# Patient Record
Sex: MALE | Race: BLACK OR AFRICAN AMERICAN | NOT HISPANIC OR LATINO | Employment: FULL TIME | URBAN - METROPOLITAN AREA
[De-identification: names, ages, dates, MRNs, and addresses within clinical notes are randomized per-mention and may not be internally consistent; named-entity substitution may affect disease eponyms.]

---

## 2019-05-26 ENCOUNTER — HOSPITAL ENCOUNTER (EMERGENCY)
Facility: CLINIC | Age: 29
Discharge: HOME | End: 2019-05-27
Attending: EMERGENCY MEDICINE | Admitting: EMERGENCY MEDICINE

## 2019-05-26 DIAGNOSIS — R10.9 BILATERAL FLANK PAIN: ICD-10-CM

## 2019-05-26 DIAGNOSIS — M54.6 ACUTE BILATERAL THORACIC BACK PAIN: ICD-10-CM

## 2019-05-26 LAB
ALBUMIN SERPL-MCNC: 4 G/DL (ref 3.4–5)
ALBUMIN UR-MCNC: NEGATIVE MG/DL
ALP SERPL-CCNC: 91 U/L (ref 40–150)
ALT SERPL W P-5'-P-CCNC: 64 U/L (ref 0–70)
ANION GAP SERPL CALCULATED.3IONS-SCNC: 7 MMOL/L (ref 3–14)
APPEARANCE UR: CLEAR
AST SERPL W P-5'-P-CCNC: 19 U/L (ref 0–45)
BACTERIA #/AREA URNS HPF: ABNORMAL /HPF
BASOPHILS # BLD AUTO: 0 10E9/L (ref 0–0.2)
BASOPHILS NFR BLD AUTO: 0.6 %
BILIRUB SERPL-MCNC: 0.3 MG/DL (ref 0.2–1.3)
BILIRUB UR QL STRIP: NEGATIVE
BUN SERPL-MCNC: 14 MG/DL (ref 7–30)
CALCIUM SERPL-MCNC: 9.2 MG/DL (ref 8.5–10.1)
CHLORIDE SERPL-SCNC: 106 MMOL/L (ref 94–109)
CO2 SERPL-SCNC: 28 MMOL/L (ref 20–32)
COLOR UR AUTO: ABNORMAL
CREAT SERPL-MCNC: 0.92 MG/DL (ref 0.66–1.25)
DIFFERENTIAL METHOD BLD: NORMAL
EOSINOPHIL # BLD AUTO: 0.3 10E9/L (ref 0–0.7)
EOSINOPHIL NFR BLD AUTO: 4.2 %
ERYTHROCYTE [DISTWIDTH] IN BLOOD BY AUTOMATED COUNT: 13 % (ref 10–15)
GFR SERPL CREATININE-BSD FRML MDRD: >90 ML/MIN/{1.73_M2}
GLUCOSE SERPL-MCNC: 128 MG/DL (ref 70–99)
GLUCOSE UR STRIP-MCNC: NEGATIVE MG/DL
HCT VFR BLD AUTO: 42.5 % (ref 40–53)
HGB BLD-MCNC: 14.9 G/DL (ref 13.3–17.7)
HGB UR QL STRIP: ABNORMAL
IMM GRANULOCYTES # BLD: 0 10E9/L (ref 0–0.4)
IMM GRANULOCYTES NFR BLD: 0.3 %
KETONES UR STRIP-MCNC: NEGATIVE MG/DL
LEUKOCYTE ESTERASE UR QL STRIP: ABNORMAL
LIPASE SERPL-CCNC: 104 U/L (ref 73–393)
LYMPHOCYTES # BLD AUTO: 1.8 10E9/L (ref 0.8–5.3)
LYMPHOCYTES NFR BLD AUTO: 27.3 %
MCH RBC QN AUTO: 28.9 PG (ref 26.5–33)
MCHC RBC AUTO-ENTMCNC: 35.1 G/DL (ref 31.5–36.5)
MCV RBC AUTO: 82 FL (ref 78–100)
MONOCYTES # BLD AUTO: 0.5 10E9/L (ref 0–1.3)
MONOCYTES NFR BLD AUTO: 6.9 %
NEUTROPHILS # BLD AUTO: 4 10E9/L (ref 1.6–8.3)
NEUTROPHILS NFR BLD AUTO: 60.7 %
NITRATE UR QL: NEGATIVE
NRBC # BLD AUTO: 0 10*3/UL
NRBC BLD AUTO-RTO: 0 /100
PH UR STRIP: 7.5 PH (ref 5–7)
PLATELET # BLD AUTO: 296 10E9/L (ref 150–450)
POTASSIUM SERPL-SCNC: 3.1 MMOL/L (ref 3.4–5.3)
PROT SERPL-MCNC: 8 G/DL (ref 6.8–8.8)
RBC # BLD AUTO: 5.16 10E12/L (ref 4.4–5.9)
RBC #/AREA URNS AUTO: 6 /HPF (ref 0–2)
SODIUM SERPL-SCNC: 141 MMOL/L (ref 133–144)
SOURCE: ABNORMAL
SP GR UR STRIP: 1.01 (ref 1–1.03)
UROBILINOGEN UR STRIP-MCNC: NORMAL MG/DL (ref 0–2)
WBC # BLD AUTO: 6.6 10E9/L (ref 4–11)
WBC #/AREA URNS AUTO: <1 /HPF (ref 0–5)

## 2019-05-26 PROCEDURE — 80053 COMPREHEN METABOLIC PANEL: CPT | Performed by: EMERGENCY MEDICINE

## 2019-05-26 PROCEDURE — 96375 TX/PRO/DX INJ NEW DRUG ADDON: CPT

## 2019-05-26 PROCEDURE — 25000128 H RX IP 250 OP 636: Performed by: EMERGENCY MEDICINE

## 2019-05-26 PROCEDURE — 96374 THER/PROPH/DIAG INJ IV PUSH: CPT

## 2019-05-26 PROCEDURE — 85025 COMPLETE CBC W/AUTO DIFF WBC: CPT | Performed by: EMERGENCY MEDICINE

## 2019-05-26 PROCEDURE — 99284 EMERGENCY DEPT VISIT MOD MDM: CPT | Mod: 25

## 2019-05-26 PROCEDURE — 81001 URINALYSIS AUTO W/SCOPE: CPT | Performed by: EMERGENCY MEDICINE

## 2019-05-26 PROCEDURE — 83690 ASSAY OF LIPASE: CPT | Performed by: EMERGENCY MEDICINE

## 2019-05-26 PROCEDURE — 25000132 ZZH RX MED GY IP 250 OP 250 PS 637: Performed by: EMERGENCY MEDICINE

## 2019-05-26 RX ORDER — CYCLOBENZAPRINE HCL 10 MG
10 TABLET ORAL ONCE
Status: COMPLETED | OUTPATIENT
Start: 2019-05-26 | End: 2019-05-26

## 2019-05-26 RX ORDER — POTASSIUM CHLORIDE 1500 MG/1
40 TABLET, EXTENDED RELEASE ORAL ONCE
Status: COMPLETED | OUTPATIENT
Start: 2019-05-26 | End: 2019-05-26

## 2019-05-26 RX ORDER — KETOROLAC TROMETHAMINE 15 MG/ML
10 INJECTION, SOLUTION INTRAMUSCULAR; INTRAVENOUS ONCE
Status: COMPLETED | OUTPATIENT
Start: 2019-05-26 | End: 2019-05-26

## 2019-05-26 RX ORDER — ONDANSETRON 2 MG/ML
4 INJECTION INTRAMUSCULAR; INTRAVENOUS EVERY 30 MIN PRN
Status: DISCONTINUED | OUTPATIENT
Start: 2019-05-26 | End: 2019-05-27 | Stop reason: HOSPADM

## 2019-05-26 RX ADMIN — POTASSIUM CHLORIDE 40 MEQ: 1500 TABLET, EXTENDED RELEASE ORAL at 23:46

## 2019-05-26 RX ADMIN — CYCLOBENZAPRINE HYDROCHLORIDE 10 MG: 10 TABLET, FILM COATED ORAL at 23:05

## 2019-05-26 RX ADMIN — ONDANSETRON 4 MG: 2 INJECTION INTRAMUSCULAR; INTRAVENOUS at 23:05

## 2019-05-26 RX ADMIN — KETOROLAC TROMETHAMINE 10 MG: 15 INJECTION, SOLUTION INTRAMUSCULAR; INTRAVENOUS at 23:05

## 2019-05-26 SDOH — HEALTH STABILITY: MENTAL HEALTH: HOW OFTEN DO YOU HAVE A DRINK CONTAINING ALCOHOL?: NEVER

## 2019-05-26 ASSESSMENT — ENCOUNTER SYMPTOMS
SHORTNESS OF BREATH: 0
SLEEP DISTURBANCE: 1
VOMITING: 0
BACK PAIN: 1
FEVER: 0
NAUSEA: 1
CHILLS: 1
HEMATURIA: 0
CONSTIPATION: 1
FREQUENCY: 1
DYSURIA: 0

## 2019-05-26 ASSESSMENT — MIFFLIN-ST. JEOR: SCORE: 1737.36

## 2019-05-26 NOTE — ED AVS SNAPSHOT
Emergency Department  [address] [place] [address]  Phone:  [phone]  Fax:  [phone]                                    Cornelia Sands   MRN: [id number]    Department:   Emergency Department   Date of Visit:  5/26/2019           After Visit Summary Signature Page    I have received my discharge instructions, and my questions have been answered. I have discussed any challenges I see with this plan with the nurse or doctor.    ..........................................................................................................................................  Patient/Patient Representative Signature      ..........................................................................................................................................  Patient Representative Print Name and Relationship to Patient    ..................................................               ................................................  Date                                   Time    ..........................................................................................................................................  Reviewed by Signature/Title    ...................................................              ..............................................  Date                                               Time          22EPIC Rev 08/18

## 2019-05-27 ENCOUNTER — NURSE TRIAGE (OUTPATIENT)
Dept: NURSING | Facility: CLINIC | Age: 29
End: 2019-05-27

## 2019-05-27 VITALS
WEIGHT: 170 LBS | TEMPERATURE: 98.2 F | RESPIRATION RATE: 16 BRPM | HEART RATE: 86 BPM | BODY MASS INDEX: 24.34 KG/M2 | DIASTOLIC BLOOD PRESSURE: 74 MMHG | HEIGHT: 70 IN | OXYGEN SATURATION: 100 % | SYSTOLIC BLOOD PRESSURE: 129 MMHG

## 2019-05-27 RX ORDER — CYCLOBENZAPRINE HCL 10 MG
10 TABLET ORAL 3 TIMES DAILY PRN
Qty: 15 TABLET | Refills: 0 | Status: SHIPPED | OUTPATIENT
Start: 2019-05-27 | End: 2021-04-12

## 2019-05-27 RX ORDER — IBUPROFEN 600 MG/1
600 TABLET, FILM COATED ORAL EVERY 6 HOURS PRN
Qty: 30 TABLET | Refills: 0 | Status: SHIPPED | OUTPATIENT
Start: 2019-05-27 | End: 2019-10-09

## 2019-05-27 NOTE — ED PROVIDER NOTES
History     Chief Complaint:  Back Pain     HPI   Cornelia Sands is a 29 year old male who presents to the emergency department today for evaluation of low back pain. The patient reports a deep, pressure like pain in his lower back and posterior thighs bilaterally that began approximately two days ago and has worsened since onset, now accompanied by chills, nausea with eating, decreased stool output, and increased urinary frequency. He explains that his pain is exacerbated at night when laying down and is now inhibiting his ability to sleep, prompting presentation. Here the patient ranks his current pain at an 8/10. He reports mild improvement with ibuprofen initially, though he states he has not taken this in the last 24 hours. The patient denies any fever, emesis, hematuria, dysuria, shortness of breath, or recent trauma or injury that may have resulted in his pain. Of note, the patient reports that he was placed on medication for an issue with his left kidney in 2008, though he has not had any issue since.     Allergies:  No Known Drug Allergies    Medications:    Medications reviewed. No current medications.     Past Medical History:    Issue with left kidney    Past Surgical History:    Surgical history reviewed. No pertinent surgical history.    Family History:    Mother: lung cancer  Father: abdominal tumor    Social History:  Patient used to reside in [place] and receive  Smoking Status: Never Smoker  Smokeless Tobacco: Current user  Alcohol Use: Negative  Drug Use: Negative     Review of Systems   Constitutional: Positive for chills. Negative for fever.   Respiratory: Negative for shortness of breath.    Gastrointestinal: Positive for constipation and nausea. Negative for vomiting.   Genitourinary: Positive for frequency. Negative for dysuria and hematuria.   Musculoskeletal: Positive for back pain.        Thigh pain   Psychiatric/Behavioral: Positive for sleep disturbance.   All other systems reviewed  "and are negative.      Physical Exam     Patient Vitals for the past 24 hrs:   BP Temp Temp src Pulse Resp SpO2 Height Weight   05/27/19 0020 129/74 -- -- 86 16 100 % -- --   05/26/19 2202 136/81 98.2  F (36.8  C) Oral 103 18 100 % 1.778 m (5' 10\") 77.1 kg (170 lb)     Physical Exam  Constitutional:  Appears well-developed and well-nourished, though uncomfortable. Cooperative.   HENT:   Head:    Atraumatic.   Mouth/Throat:   Oropharynx is without erythema or exudate and mucous membranes are moist.   Eyes:    Conjunctivae normal and EOM are normal.      Pupils are equal, round, and reactive to light.   Neck:    Normal range of motion. Neck supple.   Cardiovascular:  Normal rate, regular rhythm, normal heart sounds and radial and dorsalis pedis pulses are 2+ and symmetric.    Pulmonary/Chest:  Effort normal and breath sounds normal.   Abdominal:   Soft. Bowel sounds are normal.      No splenomegaly or hepatomegaly. No tenderness. No rebound.      No CVA tenderness.  Musculoskeletal:  Normal range of motion. No edema and no tenderness. Gait normal.  Bilateral lower extremity strength is normal.    Neurological:  Alert. Normal strength. No cranial nerve deficit. Gait normal.  Normal sensation to light touch in the lower extremities.  Skin:    Skin is warm and dry.   Psychiatric:   Normal mood and affect.     Emergency Department Course     Laboratory:  Laboratory findings were communicated with the patient who voiced understanding of the findings.    UA with Microscopic: Blood trace (A), pH 7.5 (H), Leukocyte Esterase small (A), RBC 6 (H), Bacteria few (A), o/w WNL   CBC: WBC 6.6, HGB 14.9,   CMP: Potassium 3.1 (L), Glucose 128 (H) o/w WNL (Creatinine 0.92)  Lipase: 104    Interventions:  2305 Zofran 4 mg IV  2305 Toradol 10 mg IV  2305 Flexeril 10 mg Oral  2346 Potassium Chloride 40 mEq Oral     Emergency Department Course:    2205 Nursing notes and vitals reviewed.    2209 I performed an exam of the patient as " documented above.     2302 IV was inserted and blood was drawn for laboratory testing, results above.    2344 The patient provided a urine sample here in the emergency department. This was sent for laboratory testing, findings above.    0010 Patient rechecked and updated.     0022 I personally reviewed the laboratory results with the patient and answered all related questions prior to discharge.    Impression & Plan      Medical Decision Making:  Cornelia Sands is a 29 year old male who presents with bilateral flank and thigh pain.  Associated symptoms include chills, nausea, and urinary frequency.   A broad differential diagnosis was considered including diverticulitis, ureterolithiasis, tumor, colitis, cholecystitis, aneurysm, dissection, hydronephrosis, pneumonia, rib fracture, UTI, pyelonephritis amongst many other etiologies.   The workup in the ED is at this point negative.  No etiology for the patients pain is found at this point and my suspicion of an intraabdominal or intrathoracic catastrophe or other worrisome etiology is very low.  I will not therefore admit for serial exams and further workup.  Patient is hemodynamically stable in ED. I suspect that his pain is most likely musculoskeletal in nature. Plan is home with flank pain recheck by primary care physician or return to ED at that time.  Return for fevers greater than 102, increasing pain, other new symptoms develop.  Flank pain handout given.  Questions were answered.     Diagnosis:    ICD-10-CM    1. Acute bilateral thoracic back pain M54.6    2. Bilateral flank pain R10.9      Disposition:   The patient is discharged to home.    Discharge Medications:  No discharge medications.     Review of your medicines      START taking      Dose / Directions   cyclobenzaprine 10 MG tablet  Commonly known as:  FLEXERIL      Dose:  10 mg  Take 1 tablet (10 mg) by mouth 3 times daily as needed for muscle spasms  Quantity:  15 tablet  Refills:  0     ibuprofen  600 MG tablet  Commonly known as:  ADVIL/MOTRIN      Dose:  600 mg  Take 1 tablet (600 mg) by mouth every 6 hours as needed for moderate pain  Quantity:  30 tablet  Refills:  0           Where to get your medicines      Some of these will need a paper prescription and others can be bought over the counter. Ask your nurse if you have questions.    Bring a paper prescription for each of these medications    cyclobenzaprine 10 MG tablet    ibuprofen 600 MG tablet       Scribe Disclosure:  I, Subha Aldridge, am serving as a scribe at 10:06 PM on 5/26/2019 to document services personally performed by Wilmer Yanes MD based on my observations and the provider's statements to me.     EMERGENCY DEPARTMENT       Wilmer Yanes MD  05/28/19 0612

## 2019-05-27 NOTE — TELEPHONE ENCOUNTER
"Caller states she took a flexeril tablet 4 hours after taken Toradol and potassium while in the ED. Caller wants to know if she will be ok, or if she took too much. Triager advised caller that medication was taken 4 hours apart and that should be ok as long as she is not having any adverse reactions. Caller advised to home care. Caller verbalized and understands directives.    Reason for Disposition    Caller has medication question only, adult not sick, and triager answers question    Additional Information    Negative: Drug overdose and nurse unable to answer question    Negative: Caller requesting information not related to medicine    Negative: Caller requesting a prescription for Strep throat and has a positive culture result    Negative: Rash while taking a medication or within 3 days of stopping it    Negative: Immunization reaction suspected    Negative: [1] Asthma and [2] having symptoms of asthma (cough, wheezing, etc)    Negative: MORE THAN A DOUBLE DOSE of a prescription or over-the-counter (OTC) drug    Negative: [1] DOUBLE DOSE (an extra dose or lesser amount) of over-the-counter (OTC) drug AND [2] any symptoms (e.g., dizziness, nausea, pain, sleepiness)    Negative: [1] DOUBLE DOSE (an extra dose or lesser amount) of prescription drug AND [2] any symptoms (e.g., dizziness, nausea, pain, sleepiness)    Negative: Took another person's prescription drug    Negative: [1] DOUBLE DOSE (an extra dose or lesser amount) of prescription drug AND [2] NO symptoms (Exception: a double dose of antibiotics)    Negative: Diabetes drug error or overdose (e.g., insulin or extra dose)    Negative: [1] Request for URGENT new prescription or refill of \"essential\" medication (i.e., likelihood of harm to patient if not taken) AND [2] triager unable to fill per unit policy    Negative: [1] Prescription not at pharmacy AND [2] was prescribed today by PCP    Negative: Pharmacy calling with prescription questions and triager " unable to answer question    Negative: Caller has URGENT medication question about med that PCP prescribed and triager unable to answer question    Negative: Caller has NON-URGENT medication question about med that PCP prescribed and triager unable to answer question    Negative: Caller requesting a NON-URGENT new prescription or refill and triager unable to refill per unit policy    Negative: [1] DOUBLE DOSE (an extra dose or lesser amount) of over-the-counter (OTC) drug AND [2] NO symptoms    Negative: [1] DOUBLE DOSE (an extra dose or lesser amount) of antibiotic drug AND [2] NO symptoms    Negative: Caller has medication question about med not prescribed by PCP and triager unable to answer question (e.g., compatibility with other med, storage)    Protocols used: MEDICATION QUESTION CALL-A-

## 2019-10-08 ENCOUNTER — HOSPITAL ENCOUNTER (EMERGENCY)
Facility: CLINIC | Age: 29
Discharge: HOME | End: 2019-10-09
Attending: EMERGENCY MEDICINE | Admitting: EMERGENCY MEDICINE

## 2019-10-08 DIAGNOSIS — M54.50 ACUTE BILATERAL LOW BACK PAIN WITHOUT SCIATICA: ICD-10-CM

## 2019-10-08 PROCEDURE — 96372 THER/PROPH/DIAG INJ SC/IM: CPT

## 2019-10-08 PROCEDURE — 25000132 ZZH RX MED GY IP 250 OP 250 PS 637: Performed by: EMERGENCY MEDICINE

## 2019-10-08 PROCEDURE — 99284 EMERGENCY DEPT VISIT MOD MDM: CPT | Mod: 25

## 2019-10-08 PROCEDURE — 25000128 H RX IP 250 OP 636: Performed by: EMERGENCY MEDICINE

## 2019-10-08 RX ORDER — PREDNISONE 20 MG/1
TABLET ORAL
Qty: 10 TABLET | Refills: 0 | Status: SHIPPED | OUTPATIENT
Start: 2019-10-08 | End: 2021-04-12

## 2019-10-08 RX ORDER — KETOROLAC TROMETHAMINE 15 MG/ML
15 INJECTION, SOLUTION INTRAMUSCULAR; INTRAVENOUS ONCE
Status: COMPLETED | OUTPATIENT
Start: 2019-10-08 | End: 2019-10-08

## 2019-10-08 RX ORDER — OXYCODONE AND ACETAMINOPHEN 5; 325 MG/1; MG/1
1 TABLET ORAL ONCE
Status: COMPLETED | OUTPATIENT
Start: 2019-10-08 | End: 2019-10-08

## 2019-10-08 RX ADMIN — OXYCODONE HYDROCHLORIDE AND ACETAMINOPHEN 1 TABLET: 5; 325 TABLET ORAL at 23:44

## 2019-10-08 RX ADMIN — KETOROLAC TROMETHAMINE 15 MG: 15 INJECTION, SOLUTION INTRAMUSCULAR; INTRAVENOUS at 23:46

## 2019-10-08 ASSESSMENT — ENCOUNTER SYMPTOMS
MYALGIAS: 1
FEVER: 0
BACK PAIN: 1
DYSURIA: 0
HEMATURIA: 0

## 2019-10-08 ASSESSMENT — MIFFLIN-ST. JEOR: SCORE: 1762.32

## 2019-10-08 NOTE — ED AVS SNAPSHOT
Emergency Department  [address] [place] [address]  Phone:  [phone]  Fax:  [phone]                                    Cornelia Sands   MRN: [id number]    Department:   Emergency Department   Date of Visit:  10/8/2019           After Visit Summary Signature Page    I have received my discharge instructions, and my questions have been answered. I have discussed any challenges I see with this plan with the nurse or doctor.    ..........................................................................................................................................  Patient/Patient Representative Signature      ..........................................................................................................................................  Patient Representative Print Name and Relationship to Patient    ..................................................               ................................................  Date                                   Time    ..........................................................................................................................................  Reviewed by Signature/Title    ...................................................              ..............................................  Date                                               Time          22EPIC Rev 08/18

## 2019-10-09 VITALS
HEART RATE: 75 BPM | RESPIRATION RATE: 16 BRPM | DIASTOLIC BLOOD PRESSURE: 60 MMHG | OXYGEN SATURATION: 100 % | TEMPERATURE: 97 F | SYSTOLIC BLOOD PRESSURE: 110 MMHG

## 2019-10-09 VITALS
HEIGHT: 71 IN | HEART RATE: 61 BPM | RESPIRATION RATE: 16 BRPM | SYSTOLIC BLOOD PRESSURE: 116 MMHG | TEMPERATURE: 98.1 F | DIASTOLIC BLOOD PRESSURE: 76 MMHG | OXYGEN SATURATION: 99 % | WEIGHT: 172 LBS | BODY MASS INDEX: 24.08 KG/M2

## 2019-10-09 DIAGNOSIS — M54.50 ACUTE BILATERAL LOW BACK PAIN WITHOUT SCIATICA: ICD-10-CM

## 2019-10-09 LAB
ALBUMIN UR-MCNC: NEGATIVE MG/DL
APPEARANCE UR: CLEAR
BILIRUB UR QL STRIP: NEGATIVE
COLOR UR AUTO: ABNORMAL
GLUCOSE UR STRIP-MCNC: NEGATIVE MG/DL
HGB UR QL STRIP: ABNORMAL
INTERPRETATION ECG - MUSE: NORMAL
KETONES UR STRIP-MCNC: NEGATIVE MG/DL
LEUKOCYTE ESTERASE UR QL STRIP: NEGATIVE
MUCOUS THREADS #/AREA URNS LPF: PRESENT /LPF
NITRATE UR QL: NEGATIVE
PH UR STRIP: 5.5 PH (ref 5–7)
RBC #/AREA URNS AUTO: 4 /HPF (ref 0–2)
SOURCE: ABNORMAL
SP GR UR STRIP: 1.03 (ref 1–1.03)
UROBILINOGEN UR STRIP-MCNC: NORMAL MG/DL (ref 0–2)
WBC #/AREA URNS AUTO: 1 /HPF (ref 0–5)

## 2019-10-09 PROCEDURE — 96372 THER/PROPH/DIAG INJ SC/IM: CPT

## 2019-10-09 PROCEDURE — 93005 ELECTROCARDIOGRAM TRACING: CPT

## 2019-10-09 PROCEDURE — 25000128 H RX IP 250 OP 636: Performed by: EMERGENCY MEDICINE

## 2019-10-09 PROCEDURE — 99284 EMERGENCY DEPT VISIT MOD MDM: CPT

## 2019-10-09 PROCEDURE — 81001 URINALYSIS AUTO W/SCOPE: CPT | Performed by: EMERGENCY MEDICINE

## 2019-10-09 RX ORDER — HALOPERIDOL 5 MG/ML
5 INJECTION INTRAMUSCULAR ONCE
Status: COMPLETED | OUTPATIENT
Start: 2019-10-09 | End: 2019-10-09

## 2019-10-09 RX ORDER — IBUPROFEN 800 MG/1
800 TABLET, FILM COATED ORAL EVERY 8 HOURS PRN
Qty: 30 TABLET | Refills: 0 | Status: SHIPPED | OUTPATIENT
Start: 2019-10-09 | End: 2021-04-12

## 2019-10-09 RX ORDER — DIPHENHYDRAMINE HYDROCHLORIDE 50 MG/ML
25 INJECTION INTRAMUSCULAR; INTRAVENOUS ONCE
Status: COMPLETED | OUTPATIENT
Start: 2019-10-09 | End: 2019-10-09

## 2019-10-09 RX ORDER — HYDROCODONE BITARTRATE AND ACETAMINOPHEN 5; 325 MG/1; MG/1
1 TABLET ORAL EVERY 6 HOURS PRN
Qty: 10 TABLET | Refills: 0 | Status: SHIPPED | OUTPATIENT
Start: 2019-10-09 | End: 2019-10-12

## 2019-10-09 RX ORDER — KETOROLAC TROMETHAMINE 15 MG/ML
15 INJECTION, SOLUTION INTRAMUSCULAR; INTRAVENOUS ONCE
Status: COMPLETED | OUTPATIENT
Start: 2019-10-09 | End: 2019-10-09

## 2019-10-09 RX ADMIN — HALOPERIDOL LACTATE 5 MG: 5 INJECTION, SOLUTION INTRAMUSCULAR at 09:44

## 2019-10-09 RX ADMIN — KETOROLAC TROMETHAMINE 15 MG: 15 INJECTION, SOLUTION INTRAMUSCULAR; INTRAVENOUS at 11:11

## 2019-10-09 RX ADMIN — DIPHENHYDRAMINE HYDROCHLORIDE 25 MG: 50 INJECTION, SOLUTION INTRAMUSCULAR; INTRAVENOUS at 09:44

## 2019-10-09 ASSESSMENT — ENCOUNTER SYMPTOMS
FEVER: 0
NAUSEA: 0
WEAKNESS: 1
BACK PAIN: 1
MYALGIAS: 1
HEADACHES: 1
NUMBNESS: 0

## 2019-10-09 NOTE — DISCHARGE INSTRUCTIONS
Please make an appointment to follow up with [facility] [phone] in 3-5 days even if entirely better.    Discharge Instructions  Back Pain  You were seen today for back pain. Back pain can have many causes, but most will get better without surgery or other specific treatment. Sometimes there is a herniated ( slipped ) disc. We do not usually do MRI scans to look for these right away, since most herniated discs will get better on their own with time.  Today, we did not find any evidence that your back pain was caused by a serious condition. However, sometimes symptoms develop over time and cannot be found during an emergency visit, so it is very important that you follow up with your primary provider.  Generally, every Emergency Department visit should have a follow-up clinic visit with either a primary or a specialty clinic/provider. Please follow-up as instructed by your emergency provider today.    Return to the Emergency Department if:  You develop a fever with your back pain.   You have weakness or change in sensation in one or both legs.  You lose control of your bowels or bladder, or cannot empty your bladder (cannot pee).  Your pain gets much worse.     Follow-up with your provider:  Unless your pain has completely gone away, please make an appointment with your provider within one week. Most of the routine care for back pain is available in a clinic and not the Emergency Department. You may need further management of your back pain, such as more pain medication, imaging such as an X-ray or MRI, or physical therapy.    What can I do to help myself?  Remain Active -- People are often afraid that they will hurt their back further or delay recovery by remaining active, but this is one of the best things you can do for your back. In fact, staying in bed for a long time to rest is not recommended. Studies have shown that people with low back pain recover faster when they remain active. Movement  helps to bring blood flow to the muscles and relieve muscle spasms as well as preventing loss of muscle strength.  Heat -- Using a heating pad can help with low back pain during the first few weeks. Do not sleep with a heating pad, as you can be burned.   Pain medications - You may take a pain medication such as Tylenol  (acetaminophen), Advil , Motrin  (ibuprofen) or Aleve  (naproxen).  If you were given a prescription for medicine here today, be sure to read all of the information (including the package insert) that comes with your prescription.  This will include important information about the medicine, its side effects, and any warnings that you need to know about.  The pharmacist who fills the prescription can provide more information and answer questions you may have about the medicine.  If you have questions or concerns that the pharmacist cannot address, please call or return to the Emergency Department.   Remember that you can always come back to the Emergency Department if you are not able to see your regular provider in the amount of time listed above, if you get any new symptoms, or if there is anything that worries you.

## 2019-10-09 NOTE — ED TRIAGE NOTES
Back pain for last 3-4 days that radiates down legs. Taking ibuprofen and muscle relaxer. also c/o headache. No loss of bowel/bladder.

## 2019-10-09 NOTE — ED PROVIDER NOTES
History     Chief Complaint:  Back pain    HPI   Cornelia Sands is a 29 year old male with a history of back pain who presents to the emergency department today for evaluation of back pain. Patient has had back pain for the last 3-4 days that radiates down his legs. His pain is worse tonight. He denies trauma to the area. His back pain is in his low back. He has been taking ibuprofen and muscle relaxants. Additionally, patient complains of a headache. Denies any episodes of incontinence. He has no history of kidney stones. He denies hematuria or dysuria. Pt has a history of back pain for the last couple years, but it has never been like this before. There has never been an injury to the area. Last medication was 4 x 200 mg ibuprofen and flexeril today at 1400. Patient was prescribed the flexeril from here in May. He notes his pain to be deep in his back, and poking his back doesn't make it worse. 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 Wife thinks that he doesn't know how to properly lift without hurting his back. He has not had any fevers at home.     Allergies:  No Known Drug Allergies    Medications:   Flexeril  Ibuprofen     Past Medical History:    Chronic back pain    Past Surgical History:   History reviewed.  No past surgical history.    Family History:   History reviewed. No pertinent family history.    Social History:  The patient was accompanied to the ED by his wife.  Smoking Status: no  Smokeless Tobacco: current user  Alcohol Use: no  Relationship status: Single   Does delivery for work, with heavy lifting    Review of Systems   Constitutional: Negative for fever.   Genitourinary: Negative for dysuria and hematuria.   Musculoskeletal: Positive for back pain and myalgias.   All other systems reviewed and are negative.      Physical Exam     Patient Vitals for the past 24 hrs:   BP Temp Temp src Heart Rate Resp SpO2 Height Weight   10/08/19 2243  "112/66 98.1  F (36.7  C) Oral 75 16 99 % 1.803 m (5' 11\") 78 kg (172 lb)        Physical Exam   General:  Sitting on bed with wife at bedside.   HENT:  No obvious trauma to head  Right Ear:  External ear normal.   Left Ear:  External ear normal.   Nose:  Nose normal.   Eyes:  Conjunctivae and EOM are normal. Pupils are equal, round, and reactive.   Neck: Normal range of motion. Neck supple. No tracheal deviation present.   CV:  Normal heart sounds. No murmur heard.  Pulm/Chest: Effort normal and breath sounds normal.   Abd: Soft. No distension. There is no tenderness. There is no rigidity, no rebound and no guarding. No CVA tenderness B/L. No pulsatile abdominal mass.  M/S: Normal range of motion. Muscle strength is +5 proximal and distal in the bilateral lower extremities. Tenderness over the lower paraspinal musculature and quadratus lumborum. No midline tenderness or step off.  Neuro: Alert. GCS 15. Reflexes +2 B/L patella and achilles  Skin: Skin is warm and dry. No rash noted. Not diaphoretic. No rash of shingles on low back.  Psych: Normal mood and affect. Behavior is normal.     Emergency Department Course   Interventions:  Toradol 15 mg, IV  Percocet 1 tablet, PO     Emergency Department Course:  Past medical records, nursing notes, and vitals reviewed.    2325: I performed an exam of the patient and obtained history, as documented above.     2345: I rechecked the patient. Findings and plan explained to the Patient. Patient discharged home with instructions regarding supportive care, medications, and reasons to return. The importance of close follow-up was reviewed.          Impression & Plan      Medical Decision Making:  Cornelia Sands is a very pleasant 29 year old year old patient who presents to the emergency department with concern of low back pain. Findings on exam and by history are most consistent with mechanical low back strain. With no direct trauma to the back and no midline tenderness, I do not " feel x-rays are indicated and no findings to suggest the need for emergency MRI. No evidence at this time to suggest any type of acute cord compromise or cauda equina syndrome. The patient has a normal neurologic exam of the lower extremities.     History and exam at this time does no support alternate cause of low back pain including but not limited to spinal epidural abscess or hematoma, discitis, spine fracture, or any serious referred acute intra-abdominal or genitourinary process. No urinary symptoms. The patient does not have a history of malignancy or IV drug use. The patient was counseled regarding the need for supportive care and close follow up with primary care. Indications to seek urgent re-evaluation were reviewed including but not limited to change to urinary or bowel patterns, saddle numbness, leg weakness, worsening or no improvement in pain or for any other questions or concerns.  Medications were provided as noted above.  The patient has a ride home with his wife who also drove him here.  A prescription for prednisone was provided. Early mobilization and range of motion exercises were also discussed. An understanding of the discharge instructions and need for follow up were verbally confirmed.    The treatment plan was discussed with the patient and they expressed understanding of this plan and consented to the plan.  In addition, the patient will return to the emergency department if their symptoms persist, worsen, if new symptoms arise or if there is any concern as other pathology may be present that is not evident at this time. They also understand the importance of close follow up in the clinic and if unable to do so will return to the emergency department for a reevaluation. All questions were answered.    Diagnosis:    ICD-10-CM    1. Acute bilateral low back pain without sciatica M54.5        Disposition:  discharged to home    Discharge Medications:  New Prescriptions    PREDNISONE  (DELTASONE) 20 MG TABLET    Take two tablets (= 40mg) each day for 5 (five) days       Kate Boyer  10/8/2019    EMERGENCY DEPARTMENT  Scribe Disclosure:  I, Kate Boyer, am serving as a scribe at 11:24 PM on 10/8/2019 to document services personally performed by Cristo Boyer DO based on my observations and the provider's statements to me.       Cristo Boyer DO  10/09/19 0004

## 2019-10-09 NOTE — CONSULTS
ED Care Manager Note      Met with: Patient and Family.    Data:        Cognitive Status: awake, alert and oriented.   Contact information and PCP information verified: Yes  Lives With: spouse    Insurance concerns: Self-pay     Assessment:    Identified issues/concerns regarding health management:     Pt has no health insurance and no PCP.  Pt was at [facility] ED yesterday with same complaint.  Pt will need follow up after ED visit.  SO/wife reports that she has applied for insurance through [employer], and can assist Pt in applying for medical insurance.    Action/Resources:    I gave Pt information on how to apply for [employer] and information on four low cost/free clinics near his home.  I explained that it would be more affordable to go to a clinic than the ED for non-emergent complaints.    Plan:  Will continue to follow and assist as needed.     Melissa Stark RN Care Coordinator,  DC HARRELLN, CCM, CALIN  Inpatient Care Coordination   [facility] - Emergency Department  [phone]

## 2019-10-09 NOTE — ED PROVIDER NOTES
"  History     Chief Complaint:  Low back pain    HPI   Cornelia Sands is a 29 year old male, with a history of chronic low back pain, who presents to the emergency department for evaluation of low back pain. The patient was seen here in the emergency department yesterday for evaluation of low back pain, received pain medications and was sent home with a prescription for prednisone and instructions for follow up with primary care provider. The patient reports he started experiencing bilateral low back pain that radiates down both his legs four days prior to evaluation that has been worsening over each day. He reports after he was sent home from the emergency department last night he tried taking prednisone, flexeril and ibuprofen but notes these did nothing for his pain. The patient's wife reports the patient would \"jump\" from the bed due to the pain. The patient reports the pain worsens when he is in the motion of sitting or laying down and lessens when he is walking. He does note some weakness in his bilateral thighs, but denies any numbness or urinary/stool incontinence. He also reports a headache and dry mouth. He denies any fever or nausea. He denies any history of diabetes, cancer, dialysis, IV drug use, or use of blood thinners.    Allergies:  No known drug allergies     Medications:    Flexeril  Prednisone    Past Medical History:    Chronic back pain    Past Surgical History:    History reviewed. No pertinent surgical history.    Family History:    History reviewed. No pertinent family history.     Social History:  Smoking status: Never  Smokeless tobacco: Current user  Alcohol use: No  Drug use: No  The patient presents to the emergency department with his significant other.  Marital Status:  Single [1]    Review of Systems   Constitutional: Negative for fever.   Gastrointestinal: Negative for nausea.   Genitourinary: Negative for enuresis.   Musculoskeletal: Positive for back pain and myalgias. "   Neurological: Positive for weakness and headaches. Negative for numbness.   All other systems reviewed and are negative.      Physical Exam   Patient Vitals for the past 24 hrs:   BP Temp Temp src Pulse Heart Rate Resp SpO2   10/09/19 1132 110/60 -- -- -- 72 16 --   10/09/19 0945 (!) 133/116 -- -- -- -- -- --   10/09/19 0900 120/78 -- -- -- -- -- --   10/09/19 0841 114/79 97  F (36.1  C) Temporal 75 -- 20 100 %     Physical Exam    General:  Pleasant, age appropriate.  HEENT:   Conjunctiva are normal and without erythema.    NECK:   Supple, no meningismus.     CV:    Regular rate and rhythm     No murmurs, rubs or gallops.      2+ dorsalis pedis pulses bilateral.  PULM:   Clear to auscultation bilateral.      No respiratory distress.  No stridor.  ABD:   Soft, non-tender, non-distendend.      No rebound or guarding.  MSK:   Patient is tender over the lumbar spine.      Mild tendernes at the bilateral lumbar paraspinal musculature.      No step-off to the bony spine or overlying lesions.   LYMPH:  No cervical lymphadenopathy.  NEURO:  Strength is 5/5 and sensation is intact to the lower extremities bilateral.    2+ patellar tendon reflexes bilaterally.      No clonus and down-going Babinski bilateral.  SKIN:   Warm, dry and intact.    PYSCH:   Mood is good and affect is appropriate.      Emergency Department Course   ECG (9:22:12):  Rate 65 bpm. MD interval 144. QRS duration 86. QT/QTc 398/413. P-R-T axes 48 -7 14. Normal sinus rhythm. Minimal voltage criteria for LVH, may be normal variant. Borderline ECG. Interpreted at 0930 by Maximo Lehman MD.    Laboratory:  UA: Blood (Moderate), RBC (4), Mucous (Present), o/w Negative    Interventions:  0944 Benadryl 25 mg IM  0944 Haldol 5 mg IM  1111 Toradol 15 mg IM    Emergency Department Course:  Past medical records, nursing notes, and vitals reviewed.  0854: I performed an exam of the patient and obtained history, as documented above.    EKG was obtained and  reviewed in the emergency department.     Urinalysis obtained and sent for evaluation.    1002: I rechecked the patient. Explained findings to the patient and his wife.    1121: I rechecked the patient.    Findings and plan explained to the Patient and significant other. Patient discharged home with instructions regarding supportive care, medications, and reasons to return. The importance of close follow-up was reviewed.   Impression & Plan    Medical Decision Makin-year-old male presented to the ED with atraumatic progressive worsening lower back pain.  He has no features concerning for cauda equina syndrome, epidural abscess, epidural hematoma or discitis.  Urinalysis reveals no evidence of infection.  There are a few red blood cells in the urine but nothing to draw increased concern for atypical ureteral stone.  Patient's symptoms improved in the ED.  I believe primary differential diagnosis would include muscle strain with spasm, disc herniation or nerve impingement.  Patient will be discharged home with hydrocodone, ibuprofen and outpatient referral to physical therapy.  Care coordinator help provide outpatient primary care physician referral.  Patient safe for discharge home.    Diagnosis:    ICD-10-CM   1. Acute bilateral low back pain without sciatica M54.5     Disposition:  Discharged to home with instructions for follow up.    Discharge Medications:  START taking these medications    Details   HYDROcodone-acetaminophen (NORCO) 5-325 MG tablet Take 1 tablet by mouth every 6 hours as needed for severe pain, Disp-10 tablet, R-0, Local Print     Bonny Smalls  10/9/2019   [facility] EMERGENCY DEPARTMENT  Scribe Disclosure:  Bonny WASSERMAN, am serving as a scribe at 8:54 AM on 10/9/2019 to document services personally performed by Maximo Lehman MD based on my observations and the provider's statements to me.      Maximo Lehman MD  10/09/19 9041

## 2019-10-09 NOTE — ED TRIAGE NOTES
Patient presents with lower body pain was seen at [facility] last night. Pain is better with walking and worsens with sitting and laying down. ABC's intact.

## 2019-10-09 NOTE — ED AVS SNAPSHOT
[facility] Emergency Department  [address]  [facility] [address]  Phone:  [phone]  Fax:  [phone]                                    Cornelia Sands   MRN: [id number]    Department:  [facility] Emergency Department   Date of Visit:  10/9/2019           After Visit Summary Signature Page    I have received my discharge instructions, and my questions have been answered. I have discussed any challenges I see with this plan with the nurse or doctor.    ..........................................................................................................................................  Patient/Patient Representative Signature      ..........................................................................................................................................  Patient Representative Print Name and Relationship to Patient    ..................................................               ................................................  Date                                   Time    ..........................................................................................................................................  Reviewed by Signature/Title    ...................................................              ..............................................  Date                                               Time          22EPIC Rev 08/18

## 2021-04-05 ENCOUNTER — HOSPITAL ENCOUNTER (EMERGENCY)
Facility: CLINIC | Age: 31
Discharge: LEFT BEFORE DISCHARGE | End: 2021-04-05
Admitting: EMERGENCY MEDICINE

## 2021-04-05 VITALS
WEIGHT: 170 LBS | SYSTOLIC BLOOD PRESSURE: 146 MMHG | BODY MASS INDEX: 24.34 KG/M2 | OXYGEN SATURATION: 100 % | DIASTOLIC BLOOD PRESSURE: 87 MMHG | TEMPERATURE: 98.3 F | HEART RATE: 65 BPM | HEIGHT: 70 IN | RESPIRATION RATE: 18 BRPM

## 2021-04-05 LAB — INTERPRETATION ECG - MUSE: NORMAL

## 2021-04-05 PROCEDURE — 999N000104 HC STATISTIC NO CHARGE

## 2021-04-05 ASSESSMENT — MIFFLIN-ST. JEOR: SCORE: 1727.36

## 2021-04-11 PROCEDURE — 99283 EMERGENCY DEPT VISIT LOW MDM: CPT

## 2021-04-12 ENCOUNTER — HOSPITAL ENCOUNTER (EMERGENCY)
Facility: CLINIC | Age: 31
Discharge: HOME | End: 2021-04-12
Attending: EMERGENCY MEDICINE | Admitting: EMERGENCY MEDICINE
Payer: COMMERCIAL

## 2021-04-12 VITALS
RESPIRATION RATE: 18 BRPM | SYSTOLIC BLOOD PRESSURE: 132 MMHG | TEMPERATURE: 97.5 F | DIASTOLIC BLOOD PRESSURE: 80 MMHG | HEART RATE: 69 BPM | OXYGEN SATURATION: 100 %

## 2021-04-12 DIAGNOSIS — R31.29 MICROSCOPIC HEMATURIA: ICD-10-CM

## 2021-04-12 DIAGNOSIS — N20.1 URETEROLITHIASIS: ICD-10-CM

## 2021-04-12 LAB
ALBUMIN UR-MCNC: NEGATIVE MG/DL
AMORPH CRY #/AREA URNS HPF: ABNORMAL /HPF
APPEARANCE UR: ABNORMAL
BILIRUB UR QL STRIP: NEGATIVE
COLOR UR AUTO: YELLOW
GLUCOSE UR STRIP-MCNC: NEGATIVE MG/DL
HGB UR QL STRIP: ABNORMAL
KETONES UR STRIP-MCNC: NEGATIVE MG/DL
LEUKOCYTE ESTERASE UR QL STRIP: NEGATIVE
NITRATE UR QL: NEGATIVE
PH UR STRIP: 7.5 PH (ref 5–7)
RBC #/AREA URNS AUTO: 7 /HPF (ref 0–2)
SOURCE: ABNORMAL
SP GR UR STRIP: 1.02 (ref 1–1.03)
UROBILINOGEN UR STRIP-MCNC: NORMAL MG/DL (ref 0–2)
WBC #/AREA URNS AUTO: 0 /HPF (ref 0–5)

## 2021-04-12 PROCEDURE — 81001 URINALYSIS AUTO W/SCOPE: CPT | Performed by: EMERGENCY MEDICINE

## 2021-04-12 RX ORDER — OXYCODONE HYDROCHLORIDE 5 MG/1
5 TABLET ORAL EVERY 6 HOURS PRN
Qty: 10 TABLET | Refills: 0 | Status: SHIPPED | OUTPATIENT
Start: 2021-04-12 | End: 2023-12-04

## 2021-04-12 RX ORDER — TAMSULOSIN HYDROCHLORIDE 0.4 MG/1
0.4 CAPSULE ORAL DAILY
Qty: 7 CAPSULE | Refills: 0 | Status: SHIPPED | OUTPATIENT
Start: 2021-04-12 | End: 2021-04-19

## 2021-04-12 RX ORDER — ONDANSETRON 4 MG/1
4 TABLET, ORALLY DISINTEGRATING ORAL EVERY 6 HOURS PRN
Qty: 12 TABLET | Refills: 0 | Status: SHIPPED | OUTPATIENT
Start: 2021-04-12 | End: 2021-04-15

## 2021-04-12 ASSESSMENT — ENCOUNTER SYMPTOMS: BACK PAIN: 1

## 2021-04-12 NOTE — ED PROVIDER NOTES
History     Chief Complaint:  Back Pain     HPI   Cornelia Sands is a 31 year old male with history of kidney stone who presents with back pain. The patient reports having bilateral lower back pain since yesterday. He notes having a scan of his abdomen a couple months ago that showed kidney stones. He states that the back pain is worse with movement and he was unable to work today due to pain.     Review of Systems   Musculoskeletal: Positive for back pain (lower).   All other systems reviewed and are negative.     Allergies:  No Known Allergies    Medications:  The patient is not on any medications.    Past Medical History:    Kidney stones    Past Surgical History:    Rectal procedure     Social History:  The patient presents alone.     Physical Exam     Patient Vitals for the past 24 hrs:   BP Temp Temp src Pulse Resp SpO2   04/12/21 0100 136/86 -- -- 63 -- 100 %   04/11/21 2319 124/105 97.5  F (36.4  C) Temporal 69 18 97 %     Physical Exam  Nursing note and vitals reviewed.  Constitutional: Cooperative.   HENT:   Mouth/Throat: Mucous membranes are normal.   Cardiovascular: Normal rate, regular rhythm and normal heart sounds.  No murmur.  Pulmonary/Chest: Effort normal and breath sounds normal. No respiratory distress. No wheezes.    Abdominal: Soft. Normal appearance and bowel sounds are normal. No distension. There is no tenderness. There is no rigidity and no guarding.   Musculoskeletal: Normal range of motion of LE's.   Neurological: Alert. Oriented x4  Skin: Skin is warm and dry.   Psychiatric: Normal mood and affect.      Emergency Department Course     Laboratory:  UA with micro: Urine Blood trace (A) pH Urine 7.5 (H) RBC/HPF 7 (H) amorphous crystals moderate (A)  o/w wnl/negative        Reviewed:  I reviewed the patient's nursing notes, vitals, past medical records, Care Everywhere.     Assessments:  0111  I performed an exam of the patient as documented above.   0130 Patient rechecked and updated.      Disposition:  Discharged to home.    Impression & Plan     Medical Decision Making:  Cornelia Sands is a 31 year old male with a history of kidney stones and low back pain who presents with back pain as described in the HPI. He does have microscopic hematuria consistent with probable ureterolithiasis given that this feels similar to previous stones. I do not feel that labs are indicated in this clinical setting as he is otherwise well appearing. CT imaging is unlikely to be beneficial or management changing. Plan of care will be pain control along with flomax and return precautions discussed should he become more symptomatic, develop fevers, etc. No falls, trauma, or concerns for back injury that would necessitate imaging of the lumbar or thoracic spine.     Diagnosis:    ICD-10-CM    1. Microscopic hematuria  R31.29    2. Ureterolithiasis - Probable  N20.1        Discharge Medications:  New Prescriptions    ONDANSETRON (ZOFRAN ODT) 4 MG ODT TAB    Take 1 tablet (4 mg) by mouth every 6 hours as needed for nausea    OXYCODONE (ROXICODONE) 5 MG TABLET    Take 1 tablet (5 mg) by mouth every 6 hours as needed for pain    TAMSULOSIN (FLOMAX) 0.4 MG CAPSULE    Take 1 capsule (0.4 mg) by mouth daily for 7 doses       Scribe Disclosure:  Yan WASSERMAN, am serving as a scribe at 1:11 AM on 4/12/2021 to document services personally performed by Jose Abarca MD based on my observations and the provider's statements to me.         Jose Abarca MD  04/12/21 6673

## 2022-06-05 ENCOUNTER — HOSPITAL ENCOUNTER (EMERGENCY)
Facility: HOSPITAL | Age: 32
End: 2022-06-05
Payer: COMMERCIAL

## 2023-10-10 ENCOUNTER — MEDICAL CORRESPONDENCE (OUTPATIENT)
Dept: HEALTH INFORMATION MANAGEMENT | Facility: CLINIC | Age: 33
End: 2023-10-10
Payer: COMMERCIAL

## 2023-10-10 ENCOUNTER — TELEPHONE (OUTPATIENT)
Dept: SURGERY | Facility: CLINIC | Age: 33
End: 2023-10-10
Payer: COMMERCIAL

## 2023-10-10 NOTE — TELEPHONE ENCOUNTER
I spoke with Hitesh at [facility]. She let me know she found some to see him post op for this, I told her we typically would not see someone so soon after surgery that we did not operate on. She said she will still place the referral to have him establish care in [place] in 2-3 months

## 2023-10-10 NOTE — TELEPHONE ENCOUNTER
[facility] Call Center    Phone Message    May a detailed message be left on voicemail: yes     Reason for Call: Other: Hitesh from [facility] dept stated that the pt had had a surgery for a perirectal abscess on 10/01/2023 at their facility in [place]. Caller stated the pt is in [place], [place] and does not want to return to [place] for their post-op. Caller would like to see if they can have the pt be seen for a post-op by the [facility] dept. Caller also stated reynaldo the pt had a malecot drain placed  for a Perirectal abscess. Caller is also sending a referral, but is requesting a call from the clinic to discuss scheduling. [phone] ask for Hitesh     Action Taken: Message routed to:  [facility] ELLA BENTON    Travel Screening: Not Applicable

## 2023-10-12 ENCOUNTER — HOSPITAL ENCOUNTER (EMERGENCY)
Facility: HOSPITAL | Age: 33
Discharge: HOME | End: 2023-10-12
Admitting: EMERGENCY MEDICINE
Payer: COMMERCIAL

## 2023-10-12 ENCOUNTER — APPOINTMENT (OUTPATIENT)
Dept: ULTRASOUND IMAGING | Facility: HOSPITAL | Age: 33
End: 2023-10-12
Attending: EMERGENCY MEDICINE
Payer: COMMERCIAL

## 2023-10-12 ENCOUNTER — TRANSCRIBE ORDERS (OUTPATIENT)
Dept: OTHER | Age: 33
End: 2023-10-12

## 2023-10-12 VITALS
OXYGEN SATURATION: 100 % | HEART RATE: 70 BPM | HEIGHT: 71 IN | BODY MASS INDEX: 23.71 KG/M2 | DIASTOLIC BLOOD PRESSURE: 64 MMHG | RESPIRATION RATE: 16 BRPM | SYSTOLIC BLOOD PRESSURE: 112 MMHG | TEMPERATURE: 98.4 F

## 2023-10-12 DIAGNOSIS — M79.605 PAIN OF LEFT LOWER EXTREMITY: ICD-10-CM

## 2023-10-12 DIAGNOSIS — K61.1 PERIRECTAL ABSCESS: Primary | ICD-10-CM

## 2023-10-12 PROCEDURE — 250N000013 HC RX MED GY IP 250 OP 250 PS 637: Performed by: EMERGENCY MEDICINE

## 2023-10-12 PROCEDURE — 93971 EXTREMITY STUDY: CPT | Mod: LT

## 2023-10-12 PROCEDURE — 99284 EMERGENCY DEPT VISIT MOD MDM: CPT | Mod: 25

## 2023-10-12 RX ORDER — CYCLOBENZAPRINE HCL 10 MG
10 TABLET ORAL 3 TIMES DAILY PRN
Qty: 21 TABLET | Refills: 0 | Status: SHIPPED | OUTPATIENT
Start: 2023-10-12 | End: 2023-10-19

## 2023-10-12 RX ORDER — ACETAMINOPHEN 325 MG/1
975 TABLET ORAL ONCE
Status: COMPLETED | OUTPATIENT
Start: 2023-10-12 | End: 2023-10-12

## 2023-10-12 RX ORDER — LIDOCAINE 4 G/G
1 PATCH TOPICAL ONCE
Status: DISCONTINUED | OUTPATIENT
Start: 2023-10-12 | End: 2023-10-12 | Stop reason: HOSPADM

## 2023-10-12 RX ORDER — IBUPROFEN 600 MG/1
600 TABLET, FILM COATED ORAL ONCE
Status: COMPLETED | OUTPATIENT
Start: 2023-10-12 | End: 2023-10-12

## 2023-10-12 RX ORDER — CYCLOBENZAPRINE HCL 10 MG
10 TABLET ORAL ONCE
Status: COMPLETED | OUTPATIENT
Start: 2023-10-12 | End: 2023-10-12

## 2023-10-12 RX ORDER — LIDOCAINE 50 MG/G
1 PATCH TOPICAL EVERY 24 HOURS
Qty: 10 PATCH | Refills: 0 | Status: SHIPPED | OUTPATIENT
Start: 2023-10-12 | End: 2023-10-22

## 2023-10-12 RX ADMIN — LIDOCAINE 1 PATCH: 4 PATCH TOPICAL at 17:59

## 2023-10-12 RX ADMIN — CYCLOBENZAPRINE 10 MG: 10 TABLET, FILM COATED ORAL at 17:59

## 2023-10-12 RX ADMIN — IBUPROFEN 600 MG: 600 TABLET, FILM COATED ORAL at 17:59

## 2023-10-12 RX ADMIN — ACETAMINOPHEN 975 MG: 325 TABLET ORAL at 17:59

## 2023-10-12 ASSESSMENT — ACTIVITIES OF DAILY LIVING (ADL): ADLS_ACUITY_SCORE: 35

## 2023-10-12 NOTE — ED TRIAGE NOTES
Patient presents to ED for abscess on buttocks.  Patient had procedure done 2 weeks ago and had drain applied to abscess.  States he was suppose to get drain out 2 days ago, but was unable to do so because did not have a ride.  Patient states he is still having pain in the area.       Triage Assessment (Adult)       Row Name 10/12/23 6036          Triage Assessment    Airway WDL WDL        Respiratory WDL    Respiratory WDL WDL        Skin Circulation/Temperature WDL    Skin Circulation/Temperature WDL X  abcess on buttocks        Cardiac WDL    Cardiac WDL WDL        Peripheral/Neurovascular WDL    Peripheral Neurovascular WDL WDL        Cognitive/Neuro/Behavioral WDL    Cognitive/Neuro/Behavioral WDL WDL

## 2023-10-12 NOTE — ED PROVIDER NOTES
EMERGENCY DEPARTMENT ENCOUNTER      NAME: Cornelia Sands  AGE: 33 year old male  YOB: 1990  MRN: [id number]  EVALUATION DATE & TIME: 10/12/2023  5:33 PM    PCP: No Ref-Primary, Physician    ED PROVIDER: Kaitlin Pike PA-C      Chief Complaint   Patient presents with    abcess         FINAL IMPRESSION:  1. Pain of left lower extremity          MEDICAL DECISION MAKING:    Pertinent Labs & Imaging studies reviewed. (See chart for details)  33 year old male presents to the Emergency Department for evaluation of leg pain.  The patient was admitted to [facility] in [place] for a perirectal abscess that had a drain placement and he was discharged home with oral Augmentin.  Since then, he has not been moving much and laying in bed due to his symptoms.  Over the last few days he has developed worsening left posterior thigh pain and he was concerned so he presents to the emergency department.  On my evaluation, the patient was vitally stable and overall well-appearing.  Examination with palpable muscle spasm to the posterior left thigh with distal CMS intact.  Normal range of motion of bilateral lower extremities.  No midline cervical, thoracic or lumbar spinal tenderness to palpation.  Rectal exam with drain in place without any surrounding erythema, fluctuance or drainage.  Abdomen soft, nontender without any rebound or guarding.  No midline cervical, thoracic or lumbar spinal tenderness to palpation.  Normal strength and sensation in bilateral upper and lower extremities.  Differential diagnosis included DVT, cellulitis, muscle spasm, fracture.    Patient denies any falls or injury to the area and I do not feel x-ray of the femur is indicated at this time especially with no bony tenderness on exam.  He denies any back pain and has no tenderness on exam and I do not feel this is likely related to lumbar radiculopathy.  He has not had any loss of control bowel or bladder, numbness or tingling in the groin  or lower extremity weakness I do not feel MRI of the lumbar spine to assess for cauda equina is indicated at this time.  No significant erythema or warmth to the posterior leg and I do not feel cellulitis is the likely cause of his symptoms.  With recent surgery I did feel ultrasound of the left lower extremity was indicated and this was fortunately negative for any DVT.  Patient was given Flexeril, Tylenol, ibuprofen and a lidocaine patch with significant improvement of symptoms.  At this time, I feel that his symptoms are likely musculoskeletal in nature.  He does not have any chest pain, difficulty breathing, abdominal pain, increasing rectal pain, fevers or other concerns I do not feel labs or other imaging are indicated at this time.  Unfortunately, patient still has drain in place and I was looking through records and he is not able to return to [place] at [place] to get this removed and our colorectal surgery team at [place] will not see him for the next 2 to 3 months.  I did instruct him to call the nurse again at [facility] as I think there is some miscommunication on who he is going to be following up with.  Patient and sister were in agreement and understanding.  We discussed signs and symptoms to return to the emergency department and I will discharge him home with symptomatic cares.  All questions were answered to the best my ability and he was discharged in stable condition.    Medical Decision Making    History:  Supplemental history from: Documented in chart, if applicable  External Record(s) reviewed: Documented in chart, if applicable. and Outpatient Record: [facility] on 01-Oct-2023    Work Up:  Chart documentation includes differential considered and any EKGs or imaging independently interpreted by provider, where specified.  In additional to work up documented, I considered the following work up: Documented in chart, if applicable.    External consultation:  Discussion of  management with another provider: Documented in chart, if applicable    Complicating factors:  Care impacted by chronic illness: N/A  Care affected by social determinants of health: N/A    Disposition considerations: Discharge. I prescribed additional prescription strength medication(s) as charted. See documentation for any additional details.         ED COURSE:  5:45 PM I met with the patient, obtained history, performed an initial exam, and discussed options and plan for diagnostics and treatment here in the ED.    7:00 PM Patient discharged after being provided with extensive anticipatory guidance and given return precautions, importance of PCP follow-up emphasized.    At the conclusion of the encounter I discussed the results of all of the tests and the disposition. The questions were answered. The patient or family acknowledged understanding and was agreeable with the care plan.     MEDICATIONS GIVEN IN THE EMERGENCY:  Medications   Lidocaine (LIDOCARE) 4 % Patch 1 patch (1 patch Transdermal $Patch/Med Applied 10/12/23 1759)   acetaminophen (TYLENOL) tablet 975 mg (975 mg Oral $Given 10/12/23 1759)   ibuprofen (ADVIL/MOTRIN) tablet 600 mg (600 mg Oral $Given 10/12/23 1759)   cyclobenzaprine (FLEXERIL) tablet 10 mg (10 mg Oral $Given 10/12/23 1759)       NEW PRESCRIPTIONS STARTED AT TODAY'S ER VISIT  Discharge Medication List as of 10/12/2023  7:06 PM        START taking these medications    Details   cyclobenzaprine (FLEXERIL) 10 MG tablet Take 1 tablet (10 mg) by mouth 3 times daily as needed for muscle spasms, Disp-21 tablet, R-0, Local Print      lidocaine (LIDODERM) 5 % patch Place 1 patch onto the skin every 24 hours for 10 daysDisp-10 patch, R-0Local Print                  =================================================================    HPI:    Patient information was obtained from: patient    Use of Interpretor: N/A         Cornelia Sands is a 33 year old male who presents to this ED via walk-in for  evaluation of abscess.    Per chart review, the patient presented to [facility] on 01-Oct-2023 for evaluation of perirectal abscess. CT findings with a 2 x 5 x7 cm abscess with inflammation extending into left perineum. Perirectal abscess draining performed on 01-Oct. Patient instructed to use Tylenol and ibuprofen upon discharge and follow-up to have drain removed in 1 week.    For the last few days the patient has had some left posterior thigh pain and was concerned about his symptoms we present to the emergency department.  On my evaluation, the patient denies any fevers or chills.  He denies any abdominal pain or rectal pain.  He denies any nausea or vomiting.  He states that he was initially taking oxycodone for the pain from his colorectal surgery due to the perirectal abscess however, he has only been taking Tylenol and ibuprofen for this since a few days postop and has no longer been taking Tylenol and ibuprofen for this pain as it has significantly improved and almost resolved.  He is on Augmentin and states that he has been taking this as prescribed and only has a few days left of this as well.  Drain is still in place and he states that intermittently he does still get some drainage from it.  He denies any complications with the drain.  He denies any chest pain or difficulty breathing.  He denies any falls or injuries.  He did not take any medications for his pain prior to arrival.  No other concerns voiced at this time.    REVIEW OF SYSTEMS:  Negative unless otherwise stated in the above HPI.       PAST MEDICAL HISTORY:  Past Medical History:   Diagnosis Date    Bilateral low back pain     Kidney stone        PAST SURGICAL HISTORY:  History reviewed. No pertinent surgical history.        CURRENT MEDICATIONS:      Current Facility-Administered Medications:     Lidocaine (LIDOCARE) 4 % Patch 1 patch, 1 patch, Transdermal, Once, Kaitlin Pike PA-C, 1 patch at 10/12/23 3875    Current  "Outpatient Medications:     cyclobenzaprine (FLEXERIL) 10 MG tablet, Take 1 tablet (10 mg) by mouth 3 times daily as needed for muscle spasms, Disp: 21 tablet, Rfl: 0    lidocaine (LIDODERM) 5 % patch, Place 1 patch onto the skin every 24 hours for 10 days, Disp: 10 patch, Rfl: 0    oxyCODONE (ROXICODONE) 5 MG tablet, Take 1 tablet (5 mg) by mouth every 6 hours as needed for pain, Disp: 10 tablet, Rfl: 0      ALLERGIES:  No Known Allergies    FAMILY HISTORY:  History reviewed. No pertinent family history.    SOCIAL HISTORY:   Social History     Socioeconomic History    Marital status: Single   Tobacco Use    Smoking status: Every Day    Smokeless tobacco: Current   Substance and Sexual Activity    Alcohol use: Never    Drug use: Never       VITALS:  Patient Vitals for the past 24 hrs:   BP Temp Temp src Pulse Resp SpO2 Height   10/12/23 1902 -- -- -- 70 -- 100 % --   10/12/23 1901 -- -- -- 69 -- 100 % --   10/12/23 1900 -- -- -- 68 -- 100 % --   10/12/23 1859 -- -- -- 68 -- 100 % --   10/12/23 1858 -- -- -- 72 -- 100 % --   10/12/23 1857 -- -- -- 67 -- 100 % --   10/12/23 1856 -- -- -- 65 -- 100 % --   10/12/23 1855 -- -- -- 69 -- 100 % --   10/12/23 1745 112/64 -- -- 66 -- 98 % --   10/12/23 1719 117/76 98.4  F (36.9  C) Oral 105 16 100 % 1.803 m (5' 11\")       PHYSICAL EXAM    Constitutional: Well developed, Well nourished, NAD  HENT: Normocephalic, Atraumatic, Bilateral external ears normal, Oropharynx normal, mucous membranes moist, Nose normal.   Neck: Normal range of motion, No tenderness, Supple, No stridor.  Eyes: PERRL, EOMI, Conjunctiva normal, No discharge.   Respiratory: Normal breath sounds, No respiratory distress, No wheezing, Speaks full sentences easily. No cough.  Cardiovascular: Normal heart rate, Regular rhythm, No murmurs, No rubs, No gallops. Chest wall nontender.  GI: Soft, No tenderness, No masses, No flank tenderness. No rebound or guarding.  Rectal: Drain in place, no significant " surrounding erythema, fluctuance, no active drainage.  Musculoskeletal: Tenderness to palpation of the left posterior thigh without any significant erythema, fluctuance.  No bony tenderness to palpation.  2+ DP pulses. No edema. No cyanosis, No clubbing. Good range of motion in all major joints. No tenderness to palpation or major deformities noted. No tenderness of the CTLS spine.   Integument: Warm, Dry, No erythema, No rash. No petechiae.  Neurologic: Alert & oriented x 3, Normal motor function, Normal sensory function, No focal deficits noted. Normal gait.  Psychiatric: Affect normal, Judgment normal, Mood normal. Cooperative.    LAB:  All pertinent labs reviewed and interpreted.  No results found for this or any previous visit (from the past 24 hour(s)).      RADIOLOGY:  Reviewed all pertinent imaging. Please see official radiology report.  US Lower Extremity Venous Duplex Left   Final Result   IMPRESSION:   1.  No deep venous thrombosis in the left lower extremity.          PROCEDURES:   None.       Kaitlin Pike PA-C  Emergency Medicine  [facility]  10/12/2023       Kaitlin Pike PA-C  10/12/23 1914

## 2023-10-13 NOTE — DISCHARGE INSTRUCTIONS
You were seen and evaluated here in the emergency department for your leg pain.  At this time, there is no DVT.  I do feel this is likely a muscle spasm due to you being an active since surgery.  You had improvement of symptoms with lidocaine patch, Tylenol, ibuprofen and muscle relaxer.  You are not having any worrisome signs or symptoms of the current drain or other symptoms that were require emergent work-up.  I would recommend calling the nurse from [place] again to try and get an appointment scheduled for drain removal as our [place] colorectal surgery team would not see you for the next 2 to 3 months and you do need this drain removed sooner than that.  Of course if you develop severe worsening rectal pain, fevers, abdominal pain, vomiting or other concerns return to the emergency department.

## 2023-10-17 NOTE — TELEPHONE ENCOUNTER
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 Vargas    [facility]  8/2/22 - URO OV with Dr. Angulo  4/27/22 - URO OV with Dr. Jorgensen   DISCHARGE SUMMARY from hospital Care Everywhere [facility]:  10/1/23 - Admission with Dr. Powell   DISCHARGE REPORT from the ER [facility]where [facility]:  5/4/22 - ED OV with Dr. Quiroga  4/24/22 - ED OV with Dr. Schaefer    [facility]:  4/12/21 - ED OV with Dr. Abarca   OPERATIVE REPORT Care Everywhere [facility]:  10/1/23 - OP Note for I&D PERIRECTAL ABSCESS with Dr. Plaza  4/23/15 - OP Note for I&D ABSCESS with Dr. Chiu   MEDICATION LIST Internal    LABS N/A    DIAGNOSTIC PROCEDURES N/A    IMAGING (DISC & REPORT)      CT Received [facility]:  10/4/23 - CT Pelvis  10/1/23 - CT Abd/Pelvis    [facility]:  5/4/22 - CT Abd/Pelvis  4/21/22 - CT Abd/Pelvis  4/11/22 - CT Abd/Pelvis    [facility]:  3/5/21 - CT Abd/Pelvis   ULTRASOUND  (ENDOANAL/ENDORECTAL) Received [facility]:  7/26/22 - US Renal    [facility]:  12/21/20 - US Renal     Records Requested  10/17/23    Facility  [facility]  Fax: [phone]  [facility]  Fax: [phone]  [facility]  Fax: [phone]   Outcome * 10/17/23 3:17 PM Faxed req to [facility], KEVIN, and Rizwan for images to be pushed to [place] PACs. - Carol    * 11/7/23 9:13 AM Images received from [facility], KEVIN, and [facility]izwan and attached to the patient in PACs. - Carol

## 2023-11-29 ENCOUNTER — OFFICE VISIT (OUTPATIENT)
Dept: FAMILY MEDICINE | Facility: CLINIC | Age: 33
End: 2023-11-29
Payer: COMMERCIAL

## 2023-11-29 VITALS
WEIGHT: 176 LBS | HEIGHT: 71 IN | BODY MASS INDEX: 24.64 KG/M2 | SYSTOLIC BLOOD PRESSURE: 110 MMHG | DIASTOLIC BLOOD PRESSURE: 68 MMHG | OXYGEN SATURATION: 96 % | HEART RATE: 64 BPM

## 2023-11-29 DIAGNOSIS — Z00.00 ENCOUNTER FOR MEDICAL EXAMINATION TO ESTABLISH CARE: Primary | ICD-10-CM

## 2023-11-29 PROCEDURE — 99203 OFFICE O/P NEW LOW 30 MIN: CPT | Mod: GC

## 2023-11-29 NOTE — PROGRESS NOTES
Assessment & Plan     Encounter for medical examination to establish care  Pt here to establish care, Cornelia is a healthy 32 yo M w/ pmh of chronic low back px, nephrolithiasis, and perianal abscesses. He was born in [place], though immigrated to the  several years ago, initially to [place] and then to [place] for several years. Moved to [place] in 2018. Has recently been living in [place] while working out there, now moving back to [place] and staying w/ family. He occasionally smokes cigars socially, does not drink alcohol, denies other recreational drug use. 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 Reports that he has been on abx for this several times and has had drains placed as well. Scheduled to meet w/ Surgery next month to discuss further management.   - Recommended supportive measures for discomfort w/ current abscess including seat cushion and NSAIDs  - Return in the coming months for preventative visit, pt wishes to deal w/ acute surgical issue per above before addressing this    Nicotine/Tobacco Cessation:  He reports that he has been smoking cigars. He uses smokeless tobacco.  Nicotine/Tobacco Cessation Plan:   Information offered: Patient not interested at this time    No follow-ups on file.    Isaiah Vargas MD  [facility]    Subjective   Cornelia is a 33 year old, presenting for the following health issues:  est care  (Having Surgery on 12/22. Est caring first and then pro opt later )        11/29/2023     9:05 AM   Additional Questions   Roomed by kyle LOGAN     32 yo M w/ pmh of low back px, nephrolithiasis, recurrent fawad anal abscesses  - Born in [place], moved to [place] originally, then [place], moved to [place] in 2018  - [place],   - No family or insurance in [place], recently moved back to [place]. Living w/ sister currently  - Hx of recurrent abscesses perianal abscesses  -  "Placed a stent/drain in this abscess in [place] recently  - Plan was for replacement of stent last month, told to find a PCP first  - Now has appt w/ surgery 12/22 to plan this     Review of Systems   Constitutional, HEENT, cardiovascular, pulmonary, gi and gu systems are negative, except as otherwise noted.      Objective    /68   Pulse 64   Ht 1.803 m (5' 11\")   Wt 79.8 kg (176 lb)   SpO2 96%   BMI 24.55 kg/m    Body mass index is 24.55 kg/m .  Physical Exam   GENERAL: healthy, alert and no distress  NECK: no adenopathy, no asymmetry, masses, or scars and thyroid normal to palpation  RESP: lungs clear to auscultation - no rales, rhonchi or wheezes  CV: regular rate and rhythm, normal S1 S2, no S3 or S4, no murmur, click or rub, no peripheral edema and peripheral pulses strong  ABDOMEN: soft, nontender, no hepatosplenomegaly, no masses and bowel sounds normal  MS: no gross musculoskeletal defects noted, no edema    ----- Service Performed and Documented by Resident or Fellow ------              "

## 2023-11-29 NOTE — PROGRESS NOTES
Preceptor Attestation:   Patient seen, evaluated and discussed with the resident. I have verified the content of the note, which accurately reflects my assessment of the patient and the plan of care.    Supervising Physician:Arabella Koenig MD    [facility]

## 2023-11-29 NOTE — COMMUNITY RESOURCES LIST (ENGLISH)
11/29/2023   [facility]  N/A  For questions about this resource list or additional care needs, please contact your primary care clinic or care manager.  Phone: [phone]   Email: N/A   Address: [address] [place] [address]   Hours: N/A        Financial Stability       Utility payment assistance  1  [employer] - Payment Plan Credit Program Distance: 0.78 miles      Phone/Virtual   [address] [place] [address]  Language: English, Lao  Hours: Mon - Fri 7:00 AM - 7:00 PM  Fees: Free   Phone: (412) 516-5963 Email: inquire@[employer] Website: https://www.[employer]/     2  [employer] - [place] Distance: 1.13 miles      Phone/Virtual   [address] [place] [id number]  Language: English  Hours: Mon - Thu 9:00 AM - 5:00 PM  Fees: Free   Phone: (686) 383-7738 Email: general.questions@[employer] Website: https://www.[employer]          Food and Nutrition       Food pantry  3  [facility] - Food [facility]f Distance: 0.3 miles      Pickup   [address] [place] [id number]  Language: English, Emirati  Hours: Mon 12:00 PM - 6:00 PM , Tue 12:00 PM - 3:00 PM , Wed 12:00 PM - 4:00 PM , Fri 12:00 PM - 4:00 PM  Fees: Free   Phone: (988) 904-5794 Website: https://www.[facility].org/mia     4  [facility] Distance: 0.51 miles      Pickup   [address] [place] [address]  Language: English  Hours: Mon 10:00 AM - 12:30 PM  Fees: Free   Phone: (636) 817-6696 Email: uizrvpiz6758@[employer]     SNAP application assistance  5  [facility] Distance: 0.3 miles      Phone/Virtual   [address] [place] [address]  Language: English, Emirati  Hours: Mon - Fri 9:00 AM - 8:00 PM  Fees: Free   Phone: (706) 374-5729 Website: https://www.[facility].org/mia     6  [facility] - [facility] Distance: 0.72 miles      Phone/Virtual   [address] [place] [address]  Language: English, Iranian  Hours: Mon - Fri 10:00 AM - 5:00 PM  Fees: Free   Phone: (711) 676-8455 Email: sheila@[facility] Website: https://www.[facility].org/brooke-house     Soup kitchen or free meals  7  [facility] [employer] [facility] Distance: 0.59 miles      Pickup   [address] [place] [address]  Language: English, Arabic  Hours: Mon - Thu 5:15 PM - 6:15 PM  Fees: Free   Phone: (170) 454-3449 Ext.214 Email: mjnogyxude6579@[employer] Website: http://www.[employer]/     8  [facility] [place] [facility] [employer] Distance: 0.72 miles      Pickup   [address] [place] [address]  Language: English, Iranian, Arabic  Hours: Mon - Fri 11:00 AM - 12:30 PM  Fees: Free   Phone: (881) 395-5279 Email: sheila@[facility] Center[facility] Website: https://www.[facility]ablesmn.org/brooke-house          Hotlines and Helplines       Hotline - Housing crisis  9  [facility] Distance: 0.88 miles      Phone/Virtual   [address] [place] [address]  Language: English  Hours: Mon - Sun Open 24 Hours   Phone: (538) 805-4866 Email: communications@\[facility]\""-mn.org Website: https://\[facility]\""-mn.org/oursaviourshousing/     10  [facility] Distance: 1.24 miles      Phone/Virtual   [address] [place] [address]  Language: English  Hours: Mon - Sun Open 24 Hours  Fees: Free   Phone: (528) 267-6225 Email: info@saintolaf.[employer] Website: http://www.saintola.org/          Housing       Coordinated Entry access point  11  [facility] - Housing Services Distance: 1.09 miles      In-Person   [address] [place] [address]  Language: English  Hours: Mon - Fri 9:00 AM - 5:00 PM  Fees: Free   Phone: (752) 860-8507 Email: housing@[facility].org Website: http://www.[facility].org/[facility]     12  [facility] - Adult custodial [facility] Distance: 1.24 miles      In-Person    [address] [place] [address]  Language: English  Hours: Mon - Sat 10:00 PM - 5:00 PM  Fees: Free   Phone: (285) 867-8776 Email: info@saintolaf.org Website: http://www.saintMount Desert Island HospitalSt. Vibes/     Drop-in center or day shelter  13  [facility] Distance: 0.78 miles      In-Person   [address] [place] [address]  Language: English, Cape Verdean, Maori  Hours: Mon - Sat 7:00 AM - 3:00 PM  Fees: Free, Self Pay   Phone: (474) 555-3739 Email: info@[employer] Website: https://www.[facility]/locations/opportunity-center/     14  [facility] Distance: 1.04 miles      In-Person   [address] [place] [address]  Language: English  Hours: Mon - Fri 12:00 PM - 3:00 PM  Fees: Free   Phone: (109) 245-1901 Email: MindQuilt@[employer] Website: http://MindQuilt.[employer]/     Housing search assistance  15  [employer] [facility] Distance: 0.43 miles      In-Person   [address] [place] [address]  Language: English, Cape Verdean, Maori  Hours: Mon - Fri 8:30 AM - 4:30 PM  Fees: Free   Phone: (717) 633-1649 Email: shai@[facility]Content[facility]stmn.org Website: http://www.[facility] CampusContentForestmn.org/     16  [facility] Human Services - Housing and Shelter - Homeless to Housing Distance: 0.89 miles      Phone/Virtual   [address] [facility] [address] [place] [address]  Language: English  Hours: Mon - Fri 9:00 AM - 4:00 PM  Fees: Free   Phone: (404) 955-7702 Website: https://www.[employer]./[facility]#human-services     Shelter for families  17   [facility] Distance: 18.08 miles      In-Person   [address] [place] [address]  Language: English  Hours: Mon - Fri 3:00 PM - 9:00 AM , Sat - Sun Open 24 Hours  Fees: Free   Phone: (498) 398-5240 Ext.1 Website: https://www.saintandrews.org/2020/07/03/emergency-family-shelter/     Shelter for individuals  18  [facility]  Distance: 0.88 miles      In-Person   [address] [place] [address]  Language: English  Hours: Mon - Sun Open 24 Hours  Fees: Free   Phone: (823) 541-6300 Email: communications@[facility]-mn.org Website: https://oscs-[place].org/oursaviourshousing/     19  [facility] Distance: 1.77 miles      In-Person   [address] [place] [address]  Language: English  Hours: Mon - Fri 4:00 PM - 9:00 AM  Fees: Free   Phone: (364) 352-9089 Email: kiarra@[facility].org Website: https://[facility].org/[facility]/          Important Numbers & Websites       Emergency Services   911  [facility] Services   311  Poison Control   (296) 478-2192  Suicide Prevention Lifeline   (227) 922-5363 (TALK)  Child Abuse Hotline   (895) 414-1624 (4-A-Child)  Sexual Assault Hotline   (278) 512-2725 (HOPE)  National Runaway Safeline   (797) 654-4391 (RUNAWAY)  All-Options Talkline   (235) 754-1667  Substance Abuse Referral   (136) 855-8904 (HELP)

## 2023-12-01 ENCOUNTER — TELEPHONE (OUTPATIENT)
Dept: SURGERY | Facility: CLINIC | Age: 33
End: 2023-12-01
Payer: COMMERCIAL

## 2023-12-01 NOTE — TELEPHONE ENCOUNTER
Left message with patient to see if they still need their appointment with Estelle Atkins PA-C because they have a surgery scheduled with [facility]. Left call back number

## 2023-12-04 PROBLEM — R31.29 HEMATURIA, MICROSCOPIC: Status: ACTIVE | Noted: 2020-12-11

## 2023-12-04 PROBLEM — M54.50 CHRONIC BILATERAL LOW BACK PAIN WITHOUT SCIATICA: Status: ACTIVE | Noted: 2020-12-11

## 2023-12-04 PROBLEM — G89.29 CHRONIC BILATERAL LOW BACK PAIN WITHOUT SCIATICA: Status: ACTIVE | Noted: 2020-12-11

## 2023-12-04 PROBLEM — N13.2 URETERAL STONE WITH HYDRONEPHROSIS: Status: ACTIVE | Noted: 2022-05-04

## 2023-12-04 PROBLEM — N20.1 RIGHT URETERAL STONE: Status: ACTIVE | Noted: 2022-05-04

## 2023-12-04 PROBLEM — K61.1 PERIRECTAL ABSCESS: Status: ACTIVE | Noted: 2023-10-01

## 2023-12-06 ENCOUNTER — OFFICE VISIT (OUTPATIENT)
Dept: FAMILY MEDICINE | Facility: CLINIC | Age: 33
End: 2023-12-06
Payer: COMMERCIAL

## 2023-12-06 VITALS
BODY MASS INDEX: 25.48 KG/M2 | HEIGHT: 70 IN | SYSTOLIC BLOOD PRESSURE: 109 MMHG | RESPIRATION RATE: 20 BRPM | DIASTOLIC BLOOD PRESSURE: 72 MMHG | OXYGEN SATURATION: 99 % | WEIGHT: 178 LBS | HEART RATE: 71 BPM | TEMPERATURE: 97.1 F

## 2023-12-06 DIAGNOSIS — Z01.818 PREOP GENERAL PHYSICAL EXAM: Primary | ICD-10-CM

## 2023-12-06 DIAGNOSIS — Z11.59 NEED FOR HEPATITIS C SCREENING TEST: ICD-10-CM

## 2023-12-06 DIAGNOSIS — Z11.4 SCREENING FOR HIV (HUMAN IMMUNODEFICIENCY VIRUS): ICD-10-CM

## 2023-12-06 PROCEDURE — 36415 COLL VENOUS BLD VENIPUNCTURE: CPT

## 2023-12-06 PROCEDURE — 90472 IMMUNIZATION ADMIN EACH ADD: CPT

## 2023-12-06 PROCEDURE — 86803 HEPATITIS C AB TEST: CPT

## 2023-12-06 PROCEDURE — 90746 HEPB VACCINE 3 DOSE ADULT IM: CPT

## 2023-12-06 PROCEDURE — 90471 IMMUNIZATION ADMIN: CPT

## 2023-12-06 PROCEDURE — 90677 PCV20 VACCINE IM: CPT

## 2023-12-06 PROCEDURE — 87389 HIV-1 AG W/HIV-1&-2 AB AG IA: CPT

## 2023-12-06 PROCEDURE — 90715 TDAP VACCINE 7 YRS/> IM: CPT

## 2023-12-06 PROCEDURE — 99214 OFFICE O/P EST MOD 30 MIN: CPT | Mod: 25

## 2023-12-06 NOTE — PATIENT INSTRUCTIONS
Preparing for Your Surgery  Getting started  A nurse will call you to review your health history and instructions. They will give you an arrival time based on your scheduled surgery time. Please be ready to share:  Your doctor's clinic name and phone number  Your medical, surgical, and anesthesia history  A list of allergies and sensitivities  A list of medicines, including herbal treatments and over-the-counter drugs  Whether the patient has a legal guardian (ask how to send us the papers in advance)  Please tell us if you're pregnant--or if there's any chance you might be pregnant. Some surgeries may injure a fetus (unborn baby), so they require a pregnancy test. Surgeries that are safe for a fetus don't always need a test, and you can choose whether to have one.   If you have a child who's having surgery, please ask for a copy of Preparing for Your Child's Surgery.    Preparing for surgery  Within 10 to 30 days of surgery: Have a pre-op exam (sometimes called an H&P, or History and Physical). This can be done at a clinic or pre-operative center.  If you're having a , you may not need this exam. Talk to your care team.  At your pre-op exam, talk to your care team about all medicines you take. If you need to stop any medicines before surgery, ask when to start taking them again.  We do this for your safety. Many medicines can make you bleed too much during surgery. Some change how well surgery (anesthesia) drugs work.  Call your insurance company to let them know you're having surgery. (If you don't have insurance, call [phone].)  Call your clinic if there's any change in your health. This includes signs of a cold or flu (sore throat, runny nose, cough, rash, fever). It also includes a scrape or scratch near the surgery site.  If you have questions on the day of surgery, call your hospital or surgery center.  Eating and drinking guidelines  For your safety: Unless your surgeon tells you otherwise,  follow the guidelines below.  Eat and drink as usual until 8 hours before you arrive for surgery. After that, no food or milk.  Drink clear liquids until 2 hours before you arrive. These are liquids you can see through, like water, Gatorade, and Propel Water. They also include plain black coffee and tea (no cream or milk), candy, and breath mints. You can spit out gum when you arrive.  If you drink alcohol: Stop drinking it the night before surgery.  If your care team tells you to take medicine on the morning of surgery, it's okay to take it with a sip of water.  Preventing infection  Shower or bathe the night before and morning of your surgery. Follow the instructions your clinic gave you. (If no instructions, use regular soap.)  Don't shave or clip hair near your surgery site. We'll remove the hair if needed.  Don't smoke or vape the morning of surgery. You may chew nicotine gum up to 2 hours before surgery. A nicotine patch is okay.  Note: Some surgeries require you to completely quit smoking and nicotine. Check with your surgeon.  Your care team will make every effort to keep you safe from infection. We will:  Clean our hands often with soap and water (or an alcohol-based hand rub).  Clean the skin at your surgery site with a special soap that kills germs.  Give you a special gown to keep you warm. (Cold raises the risk of infection.)  Wear special hair covers, masks, gowns and gloves during surgery.  Give antibiotic medicine, if prescribed. Not all surgeries need antibiotics.  What to bring on the day of surgery  Photo ID and insurance card  Copy of your health care directive, if you have one  Glasses and hearing aids (bring cases)  You can't wear contacts during surgery  Inhaler and eye drops, if you use them (tell us about these when you arrive)  CPAP machine or breathing device, if you use them  A few personal items, if spending the night  If you have . . .  A pacemaker, ICD (cardiac defibrillator) or other  implant: Bring the ID card.  An implanted stimulator: Bring the remote control.  A legal guardian: Bring a copy of the certified (court-stamped) guardianship papers.  Please remove any jewelry, including body piercings. Leave jewelry and other valuables at home.  If you're going home the day of surgery  You must have a responsible adult drive you home. They should stay with you overnight as well.  If you don't have someone to stay with you, and you aren't safe to go home alone, we may keep you overnight. Insurance often won't pay for this.  After surgery  If it's hard to control your pain or you need more pain medicine, please call your surgeon's office.  Questions?   If you have any questions for your care team, list them here: _________________________________________________________________________________________________________________________________________________________________________ ____________________________________ ____________________________________ ____________________________________  For informational purposes only. Not to replace the advice of your health care provider. Copyright   2003, 2019 [facility]. All rights reserved. Clinically reviewed by Sherri Polanco MD. SMARTworks 933457 - REV 12/22.    How to Take Your Medication Before Surgery  - Take all of your medications before surgery as usual

## 2023-12-06 NOTE — PROGRESS NOTES
[facility] [address] [place] [address]  Phone: [phone]  Fax: [phone]  Primary Provider: Elissa Monteiro  Pre-op Performing Provider: ELISSA MONTEIRO    PREOPERATIVE EVALUATION:  Today's date: 12/6/2023    Cornelia is a 33 year old, presenting for the following:  Pre-Op Exam (12/26/23)    Surgical Information:  Surgery/Procedure: Anoscopy, possible drain placement.   Surgery Location: [facility]  Surgeon: Estelle Atkins PA-C   Surgery Date: 12/26/23  Time of Surgery: 10:30am  Where patient plans to recover: At home with family  Fax number for surgical facility: [phone]    Assessment & Plan     The proposed surgical procedure is considered LOW risk.    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 Planning for repeat anoscopy at [facility] in the [facility] later this month. Low risk procedure, RCRI score 0. Patient cleared for surgery. No PTA meds to hold.    Screening for HIV (human immunodeficiency virus)  - HIV Screening    Need for hepatitis C screening test  - Hepatitis C Screen Reflex to HCV RNA Quant and Genotype    Possible Sleep Apnea:        12/8/2023     2:36 PM   STOP-Bang Total Score   Total Score 1   Risk Stratification 0 - 2: Low Risk for LISA      - No identified additional risk factors other than previously addressed    Antiplatelet or Anticoagulation Medication Instructions:   - Patient is on no antiplatelet or anticoagulation medications.    Additional Medication Instructions:  Patient is on no additional chronic medications    RECOMMENDATION:  APPROVAL GIVEN to proceed with proposed procedure, without further diagnostic evaluation.    Subjective     HPI related to upcoming procedure:   Cornelia has hx of recurrent perianal abscesses and possible fistula. Has required many courses of abx as well as drain placement in the past. Now back in [place] after living in [place] for  work, wanting to get this taken care of.        12/6/2023     8:53 AM   Preop Questions   1. Have you ever had a heart attack or stroke? No   2. Have you ever had surgery on your heart or blood vessels, such as a stent placement, a coronary artery bypass, or surgery on an artery in your head, neck, heart, or legs? No   3. Do you have chest pain with activity? No   4. Do you have a history of  heart failure? No   5. Do you currently have a cold, bronchitis or symptoms of other infection? YES    6. Do you have a cough, shortness of breath, or wheezing? No   7. Do you or anyone in your family have previous history of blood clots? No   8. Do you or does anyone in your family have a serious bleeding problem such as prolonged bleeding following surgeries or cuts? No   9. Have you ever had problems with anemia or been told to take iron pills? No   10. Have you had any abnormal blood loss such as black, tarry or bloody stools? No   11. Have you ever had a blood transfusion? No   12. Are you willing to have a blood transfusion if it is medically needed before, during, or after your surgery? NO    13. Have you or any of your relatives ever had problems with anesthesia? No   14. Do you have sleep apnea, excessive snoring or daytime drowsiness? YES    14a. Do you have a CPAP machine? No   15. Do you have any artifical heart valves or other implanted medical devices like a pacemaker, defibrillator, or continuous glucose monitor? No   16. Do you have artificial joints? No   17. Are you allergic to latex? No     Health Care Directive:  Patient does not have a Health Care Directive or Living Will: Discussed advance care planning with patient; however, patient declined at this time.    Preoperative Review of :   reviewed - no record of controlled substances prescribed.    Review of Systems  Constitutional, neuro, ENT, endocrine, pulmonary, cardiac, gastrointestinal, genitourinary, musculoskeletal, integument and psychiatric  "systems are negative, except as otherwise noted.    Patient Active Problem List    Diagnosis Date Noted    Perirectal abscess 10/01/2023     Priority: Medium    Right ureteral stone 2022     Priority: Medium     Formatting of this note might be different from the original. Added automatically from request for surgery [id number]      Ureteral stone with hydronephrosis 2022     Priority: Medium     Formatting of this note might be different from the original. Added automatically from request for surgery [id number]      Chronic bilateral low back pain without sciatica 2020     Priority: Medium    Hematuria, microscopic 2020     Priority: Medium      Past Medical History:   Diagnosis Date    Bilateral low back pain     Kidney stone      No past surgical history on file.  No current outpatient medications on file.     No Known Allergies     Social History     Tobacco Use    Smoking status: Some Days     Types: Cigars    Smokeless tobacco: Current    Tobacco comments:     Only during Social    Substance Use Topics    Alcohol use: Never     Family History   Problem Relation Age of Onset    Cancer Mother          at 54, unsure what type of cancer    Stomach Cancer Father      History   Drug Use Unknown         Objective     /72   Pulse 71   Temp 97.1  F (36.2  C) (Oral)   Resp 20   Ht 1.778 m (5' 10\")   Wt 80.7 kg (178 lb)   SpO2 99%   BMI 25.54 kg/m      Physical Exam    GENERAL APPEARANCE: healthy, alert and no distress     EYES: EOMI,  PERRL     HENT: ear canals and TM's normal and nose and mouth without ulcers or lesions     NECK: no adenopathy, no asymmetry, masses, or scars and thyroid normal to palpation     RESP: lungs clear to auscultation - no rales, rhonchi or wheezes     CV: regular rates and rhythm, normal S1 S2, no S3 or S4 and no murmur, click or rub     ABDOMEN:  soft, nontender, no HSM or masses and bowel sounds normal     MS: extremities normal- no gross deformities " "noted, no evidence of inflammation in joints, FROM in all extremities.     SKIN: no suspicious lesions or rashes     NEURO: Normal strength and tone, sensory exam grossly normal, mentation intact and speech normal     PSYCH: mentation appears normal. and affect normal/bright    No results for input(s): \"HGB\", \"PLT\", \"INR\", \"NA\", \"POTASSIUM\", \"CR\", \"A1C\" in the last 54741 hours.     Diagnostics:  Recent Results (from the past 720 hour(s))   HIV Screening    Collection Time: 12/06/23  9:17 AM   Result Value Ref Range    HIV Antigen Antibody Combo Nonreactive Nonreactive   Hepatitis C Screen Reflex to HCV RNA Quant and Genotype    Collection Time: 12/06/23  9:17 AM   Result Value Ref Range    Hepatitis C Antibody Nonreactive Nonreactive      No EKG required for low risk surgery (cataract, skin procedure, breast biopsy, etc).    Revised Cardiac Risk Index (RCRI):  The patient has the following serious cardiovascular risks for perioperative complications:   - No serious cardiac risks = 0 points     RCRI Interpretation: 0 points: Class I (very low risk - 0.4% complication rate)         Signed Electronically by: Isaiah Vargas MD  Copy of this evaluation report is provided to requesting physician.    "

## 2023-12-06 NOTE — PROGRESS NOTES
Pre op- Fistula, Exam Under Anesthesia with possible Fistulotomy and placement of Kurtis Coleman. 12/22/23.  32 yo M w/ pmh of chronic low back px, nephrolithiasis, and perianal abscesses. No PTA medications.

## 2023-12-07 LAB
HCV AB SERPL QL IA: NONREACTIVE
HIV 1+2 AB+HIV1 P24 AG SERPL QL IA: NONREACTIVE

## 2024-01-15 ENCOUNTER — PRE VISIT (OUTPATIENT)
Dept: SURGERY | Facility: CLINIC | Age: 34
End: 2024-01-15

## 2024-01-15 ENCOUNTER — OFFICE VISIT (OUTPATIENT)
Dept: SURGERY | Facility: CLINIC | Age: 34
End: 2024-01-15
Payer: COMMERCIAL

## 2024-01-15 VITALS
SYSTOLIC BLOOD PRESSURE: 115 MMHG | BODY MASS INDEX: 25.3 KG/M2 | HEIGHT: 71 IN | WEIGHT: 180.7 LBS | TEMPERATURE: 97.5 F | HEART RATE: 55 BPM | OXYGEN SATURATION: 99 % | DIASTOLIC BLOOD PRESSURE: 72 MMHG

## 2024-01-15 DIAGNOSIS — R10.84 ABDOMINAL PAIN, GENERALIZED: ICD-10-CM

## 2024-01-15 DIAGNOSIS — K61.0 PERIANAL ABSCESS: Primary | ICD-10-CM

## 2024-01-15 DIAGNOSIS — K62.5 RECTAL BLEEDING: ICD-10-CM

## 2024-01-15 DIAGNOSIS — R19.7 DIARRHEA, UNSPECIFIED TYPE: ICD-10-CM

## 2024-01-15 PROCEDURE — 99202 OFFICE O/P NEW SF 15 MIN: CPT

## 2024-01-15 ASSESSMENT — PAIN SCALES - GENERAL: PAINLEVEL: NO PAIN (0)

## 2024-01-15 NOTE — PATIENT INSTRUCTIONS
It was a pleasure to meet you today!    Schedule the MRI in the waiting room on your way out. I will call you with results.   You will get a phone call to schedule the colonoscopy.   Come back any time you have the pain again. If you have another abscess, we might be able to drain it in clinic and save you a trip to the ER.

## 2024-01-15 NOTE — PROGRESS NOTES
"Colon and Rectal Surgery Consult Clinic Note    RE: Cornelia Sands  : 1990  SERENITY: 1/15/2024    Cornelia Sands is a very pleasant 34 year old male here with concerns for recurrent perianal abscess.    Cornelia reports his first abscess was in [place] in . He had another in [place] and another in [place], and his most recent one was a few months ago. On 10/1/23 he underwent examination under anesthesia with I&D and placement of a Malecot drain. On 23 he underwent examination under anesthesia with Dr. Alcaraz at [facility] with removal of the drain and debridement of the cavity. There was tracking from the left lateral towards posterior midline but no internal orifice identified. Since then, Cornelia reports that he has completely healed. He does not have plans to follow up with Dr. Alcaraz since he is doing well.    He reports that he can have bouts of abdominal pain and diarrhea, lasting about a week at a time. He occasionally sees bright red blood with bowel movements.     No prior colonoscopy.    Physical Examination: Exam was chaperoned by Chuck Merino, EMT-B   /72 (BP Location: Left arm, Patient Position: Sitting, Cuff Size: Adult Large)   Pulse 55   Temp 97.5  F (36.4  C) (Oral)   Ht 5' 11\"   Wt 180 lb 11.2 oz   SpO2 99%   BMI 25.20 kg/m    General: alert, oriented, in no acute distress, sitting comfortably  HEENT: moist mucous membranes  Perianal external examination:  Well healed scar at the left lateral with a small amount of induration but does not track. No fluctuance. No tenderness.    Assessment/Plan: oCrnelia Sands is a 34 year old male with recurrent perianal abscess. Now currently resolved. Discussed possibility for a fistula given recurrent nature. Will evaluate for this with a pelvic MRI, especially since a recent examination under anesthesia failed to find an internal orifice. I will call with results and next steps. Also recommended a colonoscopy to evaluate " "for Crohn's disease given his intermittent diarrhea and abdominal pain. If MRI is negative, then follow up as needed. Patient's questions were answered to his stated satisfaction and he is in agreement with this plan.     Medical history:  Past Medical History:   Diagnosis Date    Bilateral low back pain     Kidney stone        Surgical history:  No past surgical history on file.    Problem list:    Patient Active Problem List    Diagnosis Date Noted    Perirectal abscess 10/01/2023     Priority: Medium    Right ureteral stone 2022     Priority: Medium     Formatting of this note might be different from the original. Added automatically from request for surgery [id number]      Ureteral stone with hydronephrosis 2022     Priority: Medium     Formatting of this note might be different from the original. Added automatically from request for surgery [id number]      Chronic bilateral low back pain without sciatica 2020     Priority: Medium    Hematuria, microscopic 2020     Priority: Medium       Medications:  No current outpatient medications on file.       Allergies:  No Known Allergies    Family history:  Family History   Problem Relation Age of Onset    Cancer Mother          at 54, unsure what type of cancer    Stomach Cancer Father        Social history:  Social History     Tobacco Use    Smoking status: Some Days     Types: Cigars    Smokeless tobacco: Current    Tobacco comments:     Only during Social    Substance Use Topics    Alcohol use: Never    Marital status: single.    Nursing Notes:   Chuck Merino  1/15/2024  9:27 AM  Signed  Chief Complaint   Patient presents with    Consult     Anal fistula, fistulotomy with [facility] 23.       Vitals:    01/15/24 0916   BP: 115/72   BP Location: Left arm   Patient Position: Sitting   Cuff Size: Adult Large   Pulse: 55   Temp: 97.5  F (36.4  C)   TempSrc: Oral   SpO2: 99%   Weight: 180 lb 11.2 oz   Height: 5' 11\"       Body mass index is 25.2 " kg/KRISTEL Batista       20 minutes spent on the date of encounter performing chart review, history and exam, documentation and further activities as noted above.    ----------------------------------------------------  Estelle Atkins PA-C  Colon and Rectal Surgery   [facility]

## 2024-01-15 NOTE — NURSING NOTE
"Chief Complaint   Patient presents with    Consult     Anal fistula, fistulotomy with [facility] 12/22/23.       Vitals:    01/15/24 0916   BP: 115/72   BP Location: Left arm   Patient Position: Sitting   Cuff Size: Adult Large   Pulse: 55   Temp: 97.5  F (36.4  C)   TempSrc: Oral   SpO2: 99%   Weight: 180 lb 11.2 oz   Height: 5' 11\"       Body mass index is 25.2 kg/m .      Chuck Merino, EMT    "

## 2024-01-15 NOTE — LETTER
"1/15/2024       RE: Cornelia Sands  [address]  [facility] [address]     Dear Colleague,    Thank you for referring your patient, Cornelia Sands, to the [facility] [place] [facility]. Please see a copy of my visit note below.    Colon and Rectal Surgery Consult Clinic Note    RE: Cornelia Sands  : 1990  SERENITY: 1/15/2024    Cornelia Sands is a very pleasant 34 year old male here with concerns for recurrent perianal abscess.    Cornelia reports his first abscess was in [place] in . He had another in [place] and another in [place], and his most recent one was a few months ago. On 10/1/23 he underwent examination under anesthesia with I&D and placement of a Malecot drain. On 23 he underwent examination under anesthesia with Dr. Alcaraz at [facility] with removal of the drain and debridement of the cavity. There was tracking from the left lateral towards posterior midline but no internal orifice identified. Since then, Cornelia reports that he has completely healed. He does not have plans to follow up with Dr. Alcaraz since he is doing well.    He reports that he can have bouts of abdominal pain and diarrhea, lasting about a week at a time. He occasionally sees bright red blood with bowel movements.     No prior colonoscopy.    Physical Examination: Exam was chaperoned by Chuck Merino, EMT-B   /72 (BP Location: Left arm, Patient Position: Sitting, Cuff Size: Adult Large)   Pulse 55   Temp 97.5  F (36.4  C) (Oral)   Ht 5' 11\"   Wt 180 lb 11.2 oz   SpO2 99%   BMI 25.20 kg/m    General: alert, oriented, in no acute distress, sitting comfortably  HEENT: moist mucous membranes  Perianal external examination:  Well healed scar at the left lateral with a small amount of induration but does not track. No fluctuance. No tenderness.    Assessment/Plan: Cornelia Sands is a 34 year old male " with recurrent perianal abscess. Now currently resolved. Discussed possibility for a fistula given recurrent nature. Will evaluate for this with a pelvic MRI, especially since a recent examination under anesthesia failed to find an internal orifice. I will call with results and next steps. Also recommended a colonoscopy to evaluate for Crohn's disease given his intermittent diarrhea and abdominal pain. If MRI is negative, then follow up as needed. Patient's questions were answered to his stated satisfaction and he is in agreement with this plan.     Medical history:  Past Medical History:   Diagnosis Date    Bilateral low back pain     Kidney stone        Surgical history:  No past surgical history on file.    Problem list:    Patient Active Problem List    Diagnosis Date Noted    Perirectal abscess 10/01/2023     Priority: Medium    Right ureteral stone 2022     Priority: Medium     Formatting of this note might be different from the original. Added automatically from request for surgery [id number]      Ureteral stone with hydronephrosis 2022     Priority: Medium     Formatting of this note might be different from the original. Added automatically from request for surgery [id number]      Chronic bilateral low back pain without sciatica 2020     Priority: Medium    Hematuria, microscopic 2020     Priority: Medium       Medications:  No current outpatient medications on file.       Allergies:  No Known Allergies    Family history:  Family History   Problem Relation Age of Onset    Cancer Mother          at 54, unsure what type of cancer    Stomach Cancer Father        Social history:  Social History     Tobacco Use    Smoking status: Some Days     Types: Cigars    Smokeless tobacco: Current    Tobacco comments:     Only during Social    Substance Use Topics    Alcohol use: Never    Marital status: single.    Nursing Notes:   Chuck Merino  1/15/2024  9:27 AM  Signed  Chief Complaint   Patient  "presents with    Consult     Anal fistula, fistulotomy with [facility] 12/22/23.       Vitals:    01/15/24 0916   BP: 115/72   BP Location: Left arm   Patient Position: Sitting   Cuff Size: Adult Large   Pulse: 55   Temp: 97.5  F (36.4  C)   TempSrc: Oral   SpO2: 99%   Weight: 180 lb 11.2 oz   Height: 5' 11\"       Body mass index is 25.2 kg/m .      KRISTEL Garcia       20 minutes spent on the date of encounter performing chart review, history and exam, documentation and further activities as noted above.      Again, thank you for allowing me to participate in the care of your patient.      Sincerely,    Estelle Atkins PA-C    "

## 2024-01-17 ENCOUNTER — HOSPITAL ENCOUNTER (OUTPATIENT)
Facility: CLINIC | Age: 34
End: 2024-01-17
Attending: COLON & RECTAL SURGERY | Admitting: COLON & RECTAL SURGERY

## 2024-01-17 ENCOUNTER — TELEPHONE (OUTPATIENT)
Dept: GASTROENTEROLOGY | Facility: CLINIC | Age: 34
End: 2024-01-17
Payer: COMMERCIAL

## 2024-01-17 DIAGNOSIS — K61.0 ABSCESS, PERIANAL: Primary | ICD-10-CM

## 2024-01-17 NOTE — TELEPHONE ENCOUNTER
"Endoscopy Scheduling Screen    Have you had a positive Covid test in the last 14 days?  No    Are you active on MyChart?   No    What insurance is in the chart?  Other:  [employer]-pt will call back when he has the card    Ordering/Referring Provider: ARCENIO MACKAY    (If ordering provider performs procedure, schedule with ordering provider unless otherwise instructed. )    BMI: Estimated body mass index is 25.2 kg/m  as calculated from the following:    Height as of 1/15/24: 1.803 m (5' 11\").    Weight as of 1/15/24: 82 kg (180 lb 11.2 oz).     Sedation Ordered  moderate sedation.   If patient BMI > 50 do not schedule in [facility].    If patient BMI > 45 do not schedule at [facility].    Are you taking methadone or Suboxone?  No    Are you taking any prescription medications for pain 3 or more times per week?   NO - No RN review required.    Do you have a history of malignant hyperthermia or adverse reaction to anesthesia?  No    (Females) Are you currently pregnant?   No     Have you been diagnosed or told you have pulmonary hypertension?   No    Do you have an LVAD?  No    Have you been told you have moderate to severe sleep apnea?  No    Have you been told you have COPD, asthma, or any other lung disease?  No    Do you have any heart conditions?  No     Have you ever had an organ transplant?   No    Have you ever had or are you awaiting a heart or lung transplant?   No    Have you had a stroke or transient ischemic attack (TIA aka \"mini stroke\" in the last 6 months?   No    Have you been diagnosed with or been told you have cirrhosis of the liver?   No    Are you currently on dialysis?   No    Do you need assistance transferring?   No    BMI: Estimated body mass index is 25.2 kg/m  as calculated from the following:    Height as of 1/15/24: 1.803 m (5' 11\").    Weight as of 1/15/24: 82 kg (180 lb 11.2 oz).     Is patients BMI > 40 and scheduling location [facility]?  No    Do you take an injectable medication for weight loss or diabetes " (excluding insulin)?  No    Do you take the medication Naltrexone?  No    Do you take blood thinners?  No       Prep   Are you currently on dialysis or do you have chronic kidney disease?  No    Do you have a diagnosis of diabetes?  No    Do you have a diagnosis of cystic fibrosis (CF)?  No    On a regular basis do you go 3 -5 days between bowel movements?  Yes (Extended Prep)    BMI > 40?  No    Preferred Pharmacy:        [id number] - [place], [place] - [address] [place] [address]  Phone: [phone] Fax: [phone]      Final Scheduling Details   Colonoscopy prep sent?  Standard Golytely    Procedure scheduled  Colonoscopy    Surgeon:  tu     Date of procedure:  3/27     Pre-OP / PAC:   No - Not required for this site.    Location  [facility] - Per order.    Sedation   Moderate Sedation - Per order.      Patient Reminders:   You will receive a call from a Nurse to review instructions and health history.  This assessment must be completed prior to your procedure.  Failure to complete the Nurse assessment may result in the procedure being cancelled.      On the day of your procedure, please designate an adult(s) who can drive you home stay with you for the next 24 hours. The medicines used in the exam will make you sleepy. You will not be able to drive.      You cannot take public transportation, ride share services, or non-medical taxi service without a responsible caregiver.  Medical transport services are allowed with the requirement that a responsible caregiver will receive you at your destination.  We require that drivers and caregivers are confirmed prior to your procedure.

## 2024-01-27 ENCOUNTER — HOSPITAL ENCOUNTER (EMERGENCY)
Facility: CLINIC | Age: 34
End: 2024-01-27
Payer: COMMERCIAL

## 2024-03-11 RX ORDER — BISACODYL 5 MG/1
TABLET, DELAYED RELEASE ORAL
Qty: 4 TABLET | Refills: 0 | Status: SHIPPED | OUTPATIENT
Start: 2024-03-11

## 2024-03-11 NOTE — TELEPHONE ENCOUNTER
Extended Golytely Bowel Prep . Instructions were sent via letter. Bowel prep was sent 3/11/2024 to    [id number] - [place], [place] - [address] [facility].       Keya Limon RN Colorectal Cancer    Division of Gastroenterology at [facility]

## 2024-03-14 ENCOUNTER — TELEPHONE (OUTPATIENT)
Dept: GASTROENTEROLOGY | Facility: CLINIC | Age: 34
End: 2024-03-14

## 2024-03-14 NOTE — LETTER
March 18, 2024      Cornelia Sands  [address]   [facility] [address]              Dear Cornelia,        Colonoscopy      Procedure date: 3.27.2024    Anticipated arrival time: 2:15 PM   (Procedure Times are Subject to Change)    Facility location: [facility]; [address] [place] [address] - Check in location: 1st Floor [facility]. Parking information: Self pay parking available in [facility]. The [facility] [place] is  located across [facility] from the hospital and is connected to the  hospital by a skyway. The skyway access is on Level C of the parking ramp.   parking is available Monday through Friday from 7 a.m.-3 p.m.  parking attendants are stationed at Door 2 at the [facility] entrance,  located off of [address]      Important Procedure Reminders:     Prep Instructions:   Instructions on how to prepare for your upcoming procedure are found below. Please read instructions carefully. Deviation from instructions may result in less than desired outcomes and procedure may need to be rescheduled.   If you have additional questions regarding how to prepare for your upcoming procedure, contact our endoscopy pre assessment nurses at [phone] option 4 Monday through Friday 7:00am-5:00pm     Policy:   On the day of your procedure, please designatean adult(s) who can drive you home and stay with you for 6 hours post procedure. The medicines used in the exam will make you sleepy. You will not be able to drive. You cannot take public transportation, ride share services, or non-medical taxi service without a responsible caregiver.    Medical transport services are allowed with the requirement that a responsible caregiver will receive you at your destination.  We require that drivers and caregivers are confirmed prior to your procedure.    Day of procedure:  Please do not wear jewelry (i.e. earrings, rings, necklaces, watches, etc) .   Leave your purse, billfold, credit  cards, and other valuables at home.   Bring insurance card and ID.     To cancel or reschedule your procedure:   Within 14 days of your procedure if you develop any flu-like symptoms (such as fever, cough, shortness of breath), COVID-19 like symptoms or exposure to COVID-19, contact our endoscopy team at [phone] option 4 to determine if procedure can be completed or needs to be delayed.   If you need to cancel or reschedule, our endoscopy scheduling team can be reached at [phone], option 2. Monday through Friday, 7:00am-5:00pm.      Medication Reminders:    Please note the following medication holding recommendations:   N/A    ----------------------------------------------------------------------------------------------------------------------------------------------------------------------------------------------------------------------------------------------------------------------    Golytely Extended Colonoscopy Prep  Prep instructions for colonoscopy   Pre-Assessment Phone Number: [facility]; [phone] option 4      Please read these instructions carefully at least 7 days prior to your colonoscopy procedure. Be sure to follow all directions completely. The inside of your colon must be clean to allow for a complete examination for the presence of any growths, polyps, and/or abnormalities, as well as their biopsy or removal. A number of tips are included in order to make this part of the procedure as comfortable as possible.    Immediately:  You will receive a call from a Nurse to review instructions and health history.  This assessment must be completed prior to your procedure.  Failure to complete the Nurse assessment may result in the procedure being cancelled.  You must arrange for an adult to drive you home after your exam. Your colonoscopy cannot be done unless you have a ride. If you need to use public transportation, someone must ride with you and stay with you for a minimum of 6-24  hours.  Check with your insurance company to be sure they will cover this exam.Arrange for a responsible adult to drive you home on the day of the exam. This cannot be a taxi or a bus as you will need someone with you after the procedure.    7 days prior:  Talk to your prescribing provider: If you take blood thinners (such as Coumadin, Plavix, Xarelto, Eliquis, Lovenox, or others), these medications may need to be stopped temporarily before your procedure. Your prescribing provider will tell you what to do.   Talk to your prescribing provider: If you take prescription NSAIDS (such as Sulindac, Celebrex, Mobic, Relafen). Your prescribing provider will tell you what to do.   If you have diabetes, you should request an early morning appointment.  Stop taking fiber supplements and multivitamins containing iron, or any other medications containing iron.  Fill your prescription for 2 containers of Golytely and 4 Dulcolax tablets at the pharmacy.  It is very important that you stay well hydrated during the colonoscopy prep. The Golytely bowel prep is designed to clean out your colon, but it will not provide hydration. While you are taking the prescribed prep, you should also drink 64 oz. of Gatorade or similar sports drink product to drink for staying hydrated. (Avoid red and purple colors)  Stop eating corn, popcorn, nuts and foods with seeds.   Begin a restricted or low fiber diet (see list below).    2 days prior:  Drink fluids to be well hydrated, this is important. Drink at least 4 large glasses of water, Gatorade, or other similar sports drinks.  It is a good idea to use Vaseline on the skin around your anus after each bowel movement to prevent irritation. Wet wipes also help to reduce irritation.   You can have a light, low-fiber breakfast. You may also have a light, low-fiber lunch.  No solid foods after 1pm. Begin a clear liquid diet. (see list below).  At 4pm, take 2 Dulcolax (bisacodyl) tablets.  At 5pm, mix and  drink half of a jug of Golytely bowel prep. Drink an 8 oz. Glass of Golytely every 10-15 minutes until half of the jug is gone. Place the remainder of the Golytely in the refrigerator. Stay close to the bathroom.     1 day prior:  Continue clear liquid diet only (see examples below). Do not eat solid food this day.  Do not drink any red or purple colored drinks.  Stop taking NSAID pain relievers, such as Advil, Ibuprofen, Motrin, etc.  You may take Tylenol.  At 4 pm, take 2 Dulcolax (bisacodyl) tablets.  At 5 pm, drink the 2nd half of a jug of Golytely bowel prep. Drink an 8 oz. glass of Golytely every 10-15 minutes until the 1st jug of Golytely is gone.   The Golytely bowel prep will not keep you hydrated. You should drink 8-10 glasses of clear liquids throughout the day.  Before you go to bed, mix the 2nd container of Golytely and place in the refrigerator for the morning.      Procedure day:  6 hours before your check-in time, drink an 8 oz. Glass of Golytely every 10-15 minutes until half of the 2nd jug of Golytely is gone. You WILL NOT drink the entire 2nd jug of Golytely.   You may take your necessary morning medications with sips of water  Do not take diabetes medicine by mouth until after your exam.  You may drink clear liquids only up until 2 hours before your arrival time.  Do not smoke, chew tobacco, or swallow anything, including water or gum for at least 2 hours before your arrival time. This is a safety issue. Your procedure could be cancelled if you do not follow directions.  Please do not wear jewelry (i.e. earrings, rings, necklaces, watches, etc) . Leave your purse, billfold, credit cards, and other valuables at home.   Please arrive with a responsible adult who can take you home after the test and stay with you for a minimum of 24 hours: The medicine used will make you sleepy and forgetful. If you do not have someone to take you home, we will cancel your procedure. If using public transportation  you must have someone to ride with you.  Please perform your nebulizer treatments and airway clearance therapy in the morning prior to the procedure (if applicable).  If you have asthma, bring your inhalers.    CLEAR LIQUID DIET   You may have:  Water, tea, coffee (no milk or cream)  Soda pop, Gatorade (not red or purple)  Jell-O, Popsicles (no milk or fruit pieces - not red or purple)  Fat-free soup broth or bouillon  Plain hard candy, such as clear life savers (not red or purple)  Clear juices and fruit-flavored drinks, such as apple juice, white grape juice, Hi-C, and Spenser-Aid (not red or purple)   Do not have:  Milk or milk products such as ice cream, malts or shakes, or coffee creamer  Red or purple drinks of any kind such as cranberry juice or grape juice. Avoid red or purple Jell-O, Popsicles, Spenser-Aid, sorbet, sherbet and candy  Juices with pulp such as orange, grapefruit, pineapple or tomato juice  Cream soups of any kind  Alcohol and beer  Protein drinks or protein powder     LOW FIBER DIET   You may have:    Starches: White bread, rolls, biscuits, croissants, Yumiko toast, white flour tortillas, waffles, pancakes, Macedonian toast; white rice, noodles, pasta, macaroni; cooked and peeled potatoes; plain crackers, saltines; cooked farina or cream of rice; puffed rice, corn flakes, Rice Krispies, Special K   Vegetables: tender cooked and canned, vegetable broths  Fruits and fruit juices: Strained fruit juice, canned fruit without seeds or skin (not pineapple), applesauce, pear sauce, ripe bananas, melons (not watermelon)   Milk products: Milk (plain or flavored), cheese, cottage cheese, yogurt (no berries), custard, ice cream    Proteins: Tender, well-cooked ground beef, lamb, veal, ham, pork, chicken, turkey, fish or organ meats; eggs; creamy peanut butter   Fats and condiments:  Margarine, butter, oils, mayonnaise, sour cream, salad dressing, plain gravy; spices, cooked herbs; sugar, clear jelly, honey, syrup    Snacks, sweets and drinks: Pretzels, hard candy; plain cakes and cookies (no nuts or seeds); gelatin, plain pudding, sherbet, Popsicles; coffee, tea, carbonated ( fizzy ) drinks Do not have:    Starches: Breads or rolls that contain nuts, seeds or fruit; whole wheat or whole grain breads that contain more than 1 gram of fiber per slice; cornbread; corn or whole wheat tortillas; potatoes with skin; brown rice, wild rice, kasha (buckwheat), and oatmeal   Vegetables: Any raw or steamed vegetables; vegetables with seeds; corn in any form   Fruits and fruit juices: Prunes, prune juice, raisins and other dried fruits, berries and other fruits with seeds, canned pineapple juices with pulp such as orange, grapefruit, pineapple or tomato juice  Milk products: Any yogurt with nuts, seeds or berries   Proteins: Tough, fibrous meats with gristle; cooked dried beans, peas or lentils; crunchy peanut butter  Fats and condiments: Pickles, olives, relish, horseradish; jam, marmalade, preserves   Snacks, sweets and drinks: Popcorn, nuts, seeds, granola, coconut, candies made with nuts or seeds; all desserts that contain nuts, seeds, raisins and other dried fruits, coconut, whole grains or bran.      FAQ:    How do you know if your colon is cleaned out?   After completing the bowel prep, your bowel movements should be all liquid and yellow. Your bowel movements will look similar to urine in the toilet. If there are pieces of stool (poop) in the toilet, or if you can't see to the bottom of the toilet, please call our office for advice. Call [phone] and ask to speak with a nurse.   Why do you need a responsible  to take you home and stay with you?  We require a responsible adult to take you home for your safety. The sedation medicines used to relax you during the procedure can impair your judgement and reaction time, make you forgetful and possible a little unsteady. Do not drive, make any important decisions, or sign any  legal documents for 24 hours after your procedure.   It is normal to feel bloated and gassy after your procedure. Walking will help move the air through your colon. You can take non-aspirin pain relievers that contain acetaminophen (Tylenol).   When can you eat after your procedure?  You may resume your normal diet when you feel ready, unless advised otherwise by the doctor performing your procedure. Do not drink alcohol for 24 hours after your procedure.   You many resume normal activities (work, exercise, etc.) after 24 hours.   When will you get test results?  You should have your procedure results and any lab results (if applicable) by letter, [employer] message, or phone call within 2 weeks. If you have any questions, please call the doctor that referred you for the procedure.       Thank you for choosing [facility] for your procedure. If you are sent a survey regarding your care, please take the time to complete the questionnaire. We value your feedback!

## 2024-03-14 NOTE — LETTER
March 14, 2024      Cornelia Sands  [address]   [facility] [address]              Dear Cornelia,      We apologize that we have been unable to contact you by phone. We are calling in regards to your upcoming Colonoscopy  procedure that is scheduled on 3.27.2024 to complete pre assessment.    Please contact our pre assessment nursing team at [phone] option 4. We are open Monday through Friday, 7:00am to 5:00pm. Note that failure to complete Nurse assessment phone call will result in your procedure being cancelled.     Our schedules fill up quickly and are in high demand. If you know that you need to cancel, please contact us so that we can accommodate scheduling for other patients. Our endoscopy scheduling team can be reached at [phone] option 2.     Thank you,  [facility] Endoscopy Team

## 2024-03-14 NOTE — TELEPHONE ENCOUNTER
Attempted to contact patient in order to complete pre assessment questions.     No answer. Left message to return call to [phone] option 4    Missed call communication sent via letter.      Procedure details:    Patient scheduled for Colonoscopy  on 3.27.2024.     Arrival time: 1415. Procedure time 1500    Facility location: [facility]; [facility] [place] [address]. Check in location: 1st Floor [facility].     Sedation type: Conscious sedation     Pre op exam needed? N/A    Indication for procedure:   Perianal abscess [K61.0]  - Primary      Rectal bleeding [K62.5]      Diarrhea, unspecified type [R19.7]      Abdominal pain, generalized [R10.84]            Chart review:     Electronic implanted devices? No    Recent diagnosis of diverticulitis within the last 6 weeks? No    Diabetic? No      Medication review:    Anticoagulants? No    NSAIDS? No NSAID medications per patient's medication list.  RN will verify with pre-assessment call.    Other medication HOLDING recommendations:  N/A      Prep for procedure:     Bowel prep recommendation: Extended Golytely. Bowel prep prescription sent to [id number] - [place], [place] - [address] [place] [address] AT [facility]   Due to: constipation noted or reported.     Prep instructions sent via letter.       Ashly Malone RN  Endoscopy Procedure Pre Assessment RN          stable-  c/w duloxetine stable and restarted home Cymbalta 60 today

## 2024-03-18 NOTE — TELEPHONE ENCOUNTER
Second call attempt to complete pre assessment.     No answer.  Left message to return call to [phone] #4 by next business day prior to 4PM or procedure will be sent to cancel.       Ashly Malone RN  Endoscopy Procedure Pre Assessment RN

## 2024-03-19 NOTE — TELEPHONE ENCOUNTER
Pre assessment completed for upcoming procedure.   (Please see previous telephone encounter notes for complete details)    Patient  returned call.       Procedure details:    Arrival time and facility location reviewed.    Pre op exam needed? N/A    Designated  policy reviewed. Instructed to have someone stay 6  hours post procedure.       Medication review:    Medications reviewed. Please see supporting documentation below. Holding recommendations discussed (if applicable).       Prep for procedure:     Procedure prep instructions reviewed.        Any additional information needed:  N/A      Patient  verbalized understanding and had no questions or concerns at this time.      Alycia Whitmore RN  Endoscopy Procedure Pre Assessment RN  [phone] option 4

## 2024-03-26 ENCOUNTER — TELEPHONE (OUTPATIENT)
Dept: GASTROENTEROLOGY | Facility: CLINIC | Age: 34
End: 2024-03-26

## 2024-03-26 NOTE — TELEPHONE ENCOUNTER
Caller: Chuck from financial counseling     Reason for Reschedule/Cancellation   (please be detailed, any staff messages or encounters to note?): Pt stated his ordering provider said it was not urgent and would like to cancel until he has insurance coverage      Prior to reschedule please review:  Ordering Provider: ARCENIO MACKAY   Sedation Determined: Moderate  Does patient have any ASC Exclusions, please identify?: No      Notes on Cancelled Procedure:  Procedure: Lower Endoscopy [Colonoscopy]   Date: 03/27/2024  Location: [facility]; [facility] [address] [place] [address]   Surgeon: ERIC      Rescheduled: No, Pt will call back at a later time to reschedule       Did you cancel or rescheduled an EUS procedure? No.